# Patient Record
Sex: FEMALE | Race: BLACK OR AFRICAN AMERICAN | NOT HISPANIC OR LATINO | Employment: FULL TIME | URBAN - METROPOLITAN AREA
[De-identification: names, ages, dates, MRNs, and addresses within clinical notes are randomized per-mention and may not be internally consistent; named-entity substitution may affect disease eponyms.]

---

## 2023-10-18 ENCOUNTER — TELEPHONE (OUTPATIENT)
Dept: OBSTETRICS AND GYNECOLOGY | Facility: CLINIC | Age: 28
End: 2023-10-18
Payer: COMMERCIAL

## 2023-10-18 ENCOUNTER — LAB (OUTPATIENT)
Dept: LAB | Facility: LAB | Age: 28
End: 2023-10-18
Payer: COMMERCIAL

## 2023-10-18 DIAGNOSIS — O20.0 THREATENED ABORTION, ANTEPARTUM CONDITION OR COMPLICATION (HHS-HCC): ICD-10-CM

## 2023-10-18 LAB — B-HCG SERPL-ACNC: 458 MIU/ML

## 2023-10-18 PROCEDURE — 84702 CHORIONIC GONADOTROPIN TEST: CPT

## 2023-10-18 PROCEDURE — 36415 COLL VENOUS BLD VENIPUNCTURE: CPT

## 2023-10-18 NOTE — TELEPHONE ENCOUNTER
Spoke with Saba. Reassured brown discharge is reassuring. Order placed for serum HCG per Dr. Hollingsworth.

## 2023-10-19 PROBLEM — R25.1 TREMOR OF BOTH HANDS: Status: ACTIVE | Noted: 2023-10-19

## 2023-10-19 PROBLEM — G47.8 SLEEP PARALYSIS: Status: ACTIVE | Noted: 2022-07-11

## 2023-10-19 PROBLEM — N91.2 AMENORRHEA: Status: ACTIVE | Noted: 2023-10-19

## 2023-10-19 PROBLEM — R41.3 MEMORY DEFICIT: Status: ACTIVE | Noted: 2023-10-19

## 2023-10-19 PROBLEM — E55.9 VITAMIN D INSUFFICIENCY: Status: ACTIVE | Noted: 2023-10-19

## 2023-10-19 PROBLEM — B20 HIV INFECTION (MULTI): Chronic | Status: ACTIVE | Noted: 2021-09-09

## 2023-10-19 PROBLEM — Z20.2 EXPOSURE TO STD: Status: ACTIVE | Noted: 2023-10-19

## 2023-10-19 PROBLEM — R53.83 OTHER FATIGUE: Status: ACTIVE | Noted: 2022-12-13

## 2023-10-19 PROBLEM — R82.998 URINE LEUKOCYTES: Status: ACTIVE | Noted: 2023-10-19

## 2023-10-19 PROBLEM — F43.10 PTSD (POST-TRAUMATIC STRESS DISORDER): Chronic | Status: ACTIVE | Noted: 2021-08-16

## 2023-10-19 PROBLEM — R89.9 ABNORMAL LABORATORY TEST: Status: ACTIVE | Noted: 2023-10-19

## 2023-10-19 PROBLEM — O03.9 SAB (SPONTANEOUS ABORTION) (HHS-HCC): Status: ACTIVE | Noted: 2023-10-19

## 2023-10-19 PROBLEM — L70.9 ACNE: Status: ACTIVE | Noted: 2023-06-15

## 2023-10-19 PROBLEM — O99.019 ANEMIA IN PREGNANCY (HHS-HCC): Status: ACTIVE | Noted: 2023-10-19

## 2023-10-19 PROBLEM — O20.0 THREATENED SPONTANEOUS ABORTION (HHS-HCC): Status: ACTIVE | Noted: 2023-10-19

## 2023-10-19 PROBLEM — Z21 HIV INFECTION: Chronic | Status: ACTIVE | Noted: 2021-09-09

## 2023-10-19 PROBLEM — R55 SYNCOPE AND COLLAPSE: Status: ACTIVE | Noted: 2023-10-19

## 2023-10-19 PROBLEM — G47.9 SLEEP DISTURBANCE: Status: ACTIVE | Noted: 2022-07-11

## 2023-10-19 PROBLEM — R56.9 SEIZURE-LIKE ACTIVITY (MULTI): Status: ACTIVE | Noted: 2022-07-28

## 2023-10-19 PROBLEM — N89.8 LEUKORRHEA: Status: ACTIVE | Noted: 2023-10-19

## 2023-10-19 PROBLEM — N92.6 IRREGULAR MENSES: Status: ACTIVE | Noted: 2023-10-19

## 2023-10-19 PROBLEM — Z86.19 HISTORY OF HEPATITIS B: Status: ACTIVE | Noted: 2023-06-15

## 2023-10-19 PROBLEM — R30.0 DYSURIA: Status: ACTIVE | Noted: 2023-10-19

## 2023-10-19 PROBLEM — Z72.51 HIGH RISK SEXUAL BEHAVIOR: Status: ACTIVE | Noted: 2023-10-19

## 2023-10-19 PROBLEM — N32.89 IRRITABLE BLADDER: Status: ACTIVE | Noted: 2023-10-19

## 2023-10-19 PROBLEM — R63.0 POOR APPETITE: Status: ACTIVE | Noted: 2023-06-15

## 2023-10-19 PROBLEM — R31.9 HEMATURIA: Status: ACTIVE | Noted: 2023-10-19

## 2023-10-19 PROBLEM — H53.2 DOUBLE VISION: Status: ACTIVE | Noted: 2023-10-19

## 2023-10-19 PROBLEM — F41.0 PANIC ANXIETY SYNDROME: Status: ACTIVE | Noted: 2023-10-19

## 2023-10-19 PROBLEM — F39 UNSPECIFIED MOOD (AFFECTIVE) DISORDER (CMS-HCC): Chronic | Status: ACTIVE | Noted: 2022-05-10

## 2023-10-19 PROBLEM — F33.2 SEVERE EPISODE OF RECURRENT MAJOR DEPRESSIVE DISORDER (MULTI): Status: ACTIVE | Noted: 2022-04-20

## 2023-10-19 PROBLEM — F32.A DEPRESSIVE DISORDER: Status: ACTIVE | Noted: 2021-03-22

## 2023-10-19 PROBLEM — I95.9 LOW BLOOD PRESSURE: Status: ACTIVE | Noted: 2023-10-19

## 2023-10-19 PROBLEM — F41.3 OTHER MIXED ANXIETY DISORDERS: Chronic | Status: ACTIVE | Noted: 2022-04-20

## 2023-10-19 PROBLEM — R63.0 LOSS OF APPETITE: Status: ACTIVE | Noted: 2023-10-19

## 2023-10-19 RX ORDER — NICOTINE 11MG/24HR
2000 PATCH, TRANSDERMAL 24 HOURS TRANSDERMAL DAILY
COMMUNITY
Start: 2023-03-20 | End: 2023-10-20 | Stop reason: ALTCHOICE

## 2023-10-19 RX ORDER — FERROUS SULFATE 325(65) MG
1 TABLET ORAL DAILY
COMMUNITY
Start: 2019-01-07 | End: 2023-10-20 | Stop reason: ALTCHOICE

## 2023-10-19 RX ORDER — NORGESTIMATE AND ETHINYL ESTRADIOL 0.25-0.035
1 KIT ORAL DAILY
COMMUNITY
Start: 2018-10-04 | End: 2023-10-20 | Stop reason: ALTCHOICE

## 2023-10-19 RX ORDER — PROMETHAZINE HYDROCHLORIDE 12.5 MG/1
1 TABLET ORAL EVERY 6 HOURS PRN
COMMUNITY
Start: 2015-10-15 | End: 2023-10-20 | Stop reason: ALTCHOICE

## 2023-10-19 RX ORDER — PRENATAL VIT NO.179/IRON/FOLIC 28MG-0.8MG
1 TABLET ORAL
COMMUNITY
Start: 2023-08-22 | End: 2024-01-08 | Stop reason: WASHOUT

## 2023-10-19 RX ORDER — BENZOYL PEROXIDE 50 MG/ML
1 LIQUID TOPICAL 2 TIMES DAILY
COMMUNITY

## 2023-10-19 RX ORDER — TRETINOIN 0.25 MG/G
CREAM TOPICAL NIGHTLY
COMMUNITY
Start: 2015-07-14 | End: 2024-01-08 | Stop reason: WASHOUT

## 2023-10-19 RX ORDER — DOXYCYCLINE HYCLATE 100 MG
100 TABLET ORAL 2 TIMES DAILY
COMMUNITY
Start: 2015-07-14 | End: 2023-10-20 | Stop reason: ALTCHOICE

## 2023-10-19 RX ORDER — HYDROXYZINE HYDROCHLORIDE 25 MG/1
25 TABLET, FILM COATED ORAL DAILY
COMMUNITY
Start: 2023-06-12 | End: 2023-10-20 | Stop reason: ALTCHOICE

## 2023-10-19 RX ORDER — B-COMPLEX WITH VITAMIN C
1 TABLET ORAL DAILY
COMMUNITY
Start: 2018-02-10 | End: 2023-12-11

## 2023-10-19 RX ORDER — ACETAMINOPHEN 325 MG/1
3 TABLET ORAL EVERY 6 HOURS
COMMUNITY
Start: 2019-07-24 | End: 2024-05-15 | Stop reason: HOSPADM

## 2023-10-19 RX ORDER — LACTOSE-REDUCED FOOD 0.04G-1/ML
1 LIQUID (ML) ORAL DAILY
COMMUNITY
Start: 2023-06-12 | End: 2023-10-20 | Stop reason: ALTCHOICE

## 2023-10-19 RX ORDER — MEDROXYPROGESTERONE ACETATE 150 MG/ML
150 INJECTION, SUSPENSION INTRAMUSCULAR
COMMUNITY
Start: 2022-09-07 | End: 2023-10-20 | Stop reason: ALTCHOICE

## 2023-10-19 RX ORDER — CALCIUM CARBONATE 500(1250)
500 TABLET ORAL DAILY
COMMUNITY
Start: 2022-12-30 | End: 2023-10-20 | Stop reason: ALTCHOICE

## 2023-10-19 RX ORDER — CLINDAMYCIN PHOSPHATE 10 UG/ML
LOTION TOPICAL 2 TIMES DAILY
COMMUNITY
Start: 2015-07-14 | End: 2023-10-20 | Stop reason: ALTCHOICE

## 2023-10-19 RX ORDER — PRENATAL VIT NO.126/IRON/FOLIC 28MG-0.8MG
1 TABLET ORAL DAILY
COMMUNITY
Start: 2023-07-27 | End: 2024-01-08 | Stop reason: WASHOUT

## 2023-10-19 RX ORDER — PROGESTERONE 100 MG/1
1 CAPSULE ORAL 2 TIMES DAILY
COMMUNITY
Start: 2018-12-14 | End: 2024-01-08 | Stop reason: WASHOUT

## 2023-10-19 RX ORDER — MULTIVITAMIN WITH FOLIC ACID 400 MCG
1 TABLET ORAL
COMMUNITY
Start: 2023-03-20 | End: 2023-10-20 | Stop reason: ALTCHOICE

## 2023-10-19 RX ORDER — ALPRAZOLAM 1 MG/1
1 TABLET ORAL
COMMUNITY
Start: 2022-06-13 | End: 2023-10-20 | Stop reason: ALTCHOICE

## 2023-10-19 RX ORDER — CITALOPRAM 10 MG/1
1 TABLET ORAL DAILY
COMMUNITY
Start: 2018-09-27 | End: 2023-10-20 | Stop reason: ALTCHOICE

## 2023-10-19 RX ORDER — BICTEGRAVIR SODIUM, EMTRICITABINE, AND TENOFOVIR ALAFENAMIDE FUMARATE 50; 200; 25 MG/1; MG/1; MG/1
1 TABLET ORAL DAILY
COMMUNITY
Start: 2023-10-16

## 2023-10-19 RX ORDER — CETIRIZINE HYDROCHLORIDE, PSEUDOEPHEDRINE HYDROCHLORIDE 5; 120 MG/1; MG/1
1 TABLET, FILM COATED, EXTENDED RELEASE ORAL 2 TIMES DAILY
COMMUNITY
Start: 2023-08-22 | End: 2023-10-20 | Stop reason: ALTCHOICE

## 2023-10-19 RX ORDER — ERGOCALCIFEROL 1.25 MG/1
1 CAPSULE ORAL
COMMUNITY
Start: 2018-07-11 | End: 2023-10-20 | Stop reason: ALTCHOICE

## 2023-10-19 RX ORDER — IBUPROFEN 600 MG/1
600 TABLET ORAL EVERY 6 HOURS PRN
COMMUNITY
End: 2023-10-20 | Stop reason: ALTCHOICE

## 2023-10-19 RX ORDER — FLUTICASONE PROPIONATE 50 MCG
1 SPRAY, SUSPENSION (ML) NASAL DAILY
COMMUNITY
Start: 2023-08-22 | End: 2023-10-20 | Stop reason: ALTCHOICE

## 2023-10-19 RX ORDER — NORELGESTROMIN AND ETHINYL ESTRADIOL 150; 35 UG/D; UG/D
1 PATCH TRANSDERMAL
COMMUNITY
Start: 2019-10-01 | End: 2023-10-20 | Stop reason: ALTCHOICE

## 2023-10-19 RX ORDER — IBUPROFEN 800 MG/1
800 TABLET ORAL EVERY 6 HOURS PRN
COMMUNITY
Start: 2023-07-27 | End: 2023-10-20 | Stop reason: ALTCHOICE

## 2023-10-20 ENCOUNTER — LAB (OUTPATIENT)
Dept: LAB | Facility: LAB | Age: 28
End: 2023-10-20
Payer: COMMERCIAL

## 2023-10-20 ENCOUNTER — ANCILLARY PROCEDURE (OUTPATIENT)
Dept: OBSTETRICS AND GYNECOLOGY | Facility: CLINIC | Age: 28
End: 2023-10-20
Payer: COMMERCIAL

## 2023-10-20 ENCOUNTER — OFFICE VISIT (OUTPATIENT)
Dept: OBSTETRICS AND GYNECOLOGY | Facility: CLINIC | Age: 28
End: 2023-10-20
Payer: COMMERCIAL

## 2023-10-20 VITALS
SYSTOLIC BLOOD PRESSURE: 112 MMHG | WEIGHT: 171 LBS | DIASTOLIC BLOOD PRESSURE: 72 MMHG | HEIGHT: 65 IN | BODY MASS INDEX: 28.49 KG/M2

## 2023-10-20 DIAGNOSIS — N91.2 AMENORRHEA: ICD-10-CM

## 2023-10-20 DIAGNOSIS — O20.0 THREATENED ABORTION, ANTEPARTUM CONDITION OR COMPLICATION (HHS-HCC): ICD-10-CM

## 2023-10-20 DIAGNOSIS — O20.0 THREATENED ABORTION, ANTEPARTUM CONDITION OR COMPLICATION (HHS-HCC): Primary | ICD-10-CM

## 2023-10-20 LAB
B-HCG SERPL-ACNC: 450 MIU/ML
PREGNANCY TEST URINE, POC: POSITIVE

## 2023-10-20 PROCEDURE — 36415 COLL VENOUS BLD VENIPUNCTURE: CPT

## 2023-10-20 PROCEDURE — 84702 CHORIONIC GONADOTROPIN TEST: CPT

## 2023-10-20 PROCEDURE — 76817 TRANSVAGINAL US OBSTETRIC: CPT | Performed by: OBSTETRICS & GYNECOLOGY

## 2023-10-20 PROCEDURE — 99204 OFFICE O/P NEW MOD 45 MIN: CPT | Performed by: OBSTETRICS & GYNECOLOGY

## 2023-10-20 PROCEDURE — 1036F TOBACCO NON-USER: CPT | Performed by: OBSTETRICS & GYNECOLOGY

## 2023-10-20 PROCEDURE — 76801 OB US < 14 WKS SINGLE FETUS: CPT | Performed by: OBSTETRICS & GYNECOLOGY

## 2023-10-20 PROCEDURE — 81025 URINE PREGNANCY TEST: CPT | Performed by: OBSTETRICS & GYNECOLOGY

## 2023-10-20 NOTE — PROGRESS NOTES
Subjective   Patient ID: Saba Salamanca is a 28 y.o. female who presents for confirm pregnancy.  ntains abnormal data hCG, quantitative  Order: [id number]  Status: Final result       Visible to patient: Yes (not seen)       Next appt: None       Dx: Threatened , antepartum condi...    2 Result Notes           Component  Ref Range & Units 2 d ago  (10/18/23) 5 yr ago  (10/9/18) 5 yr ago  (18) 5 yr ago  (18) 5 yr ago  (18) 5 yr ago  (18) 5 yr ago  (18)  HCG, Beta-Quantitative  <5 mIU/mL 458 High  10.0 R, .8 R, CM 9,433.0 R, CM 32,400.0 High Panic  R, CM 76,813 Abnormal  R, CM 74,613 Abnormal  R, CM  Comment: Low-level positive HCG results can be seen in early pregnancy, in aston- or post-menopausal females due to normal pituitary HCG production, or with analytic interference. Repeat testing in 48-72 hours can aid in assessing for pregnancy as results should double in this time period. FSH measurement is recommended in aston- or post-menopausal females as concurrent elevation of FSH can support pituitary production as the source of the HCG elevation.  [facility] [place]           Narrative  Performed by: [facility]    Total HCG measurement is performed using the Siemens Atellica immunoassay which detects intact HCG and free beta HCG subunit.  This test is not indicated for use as a tumor marker.  HCG testing is performed using a different test methodology at [facility] than other [facility]. Direct result comparison  should only be made within the same method.    Patient last seen in .  28 years old.  .   5 para 2.  Children are ages 7 and 4.  2 prior vaginal deliveries.  Trying to conceive.  First day of her last menses was .  She started bleeding this past Wednesday.  We did obtain a recent hCG.  Ultrasound shows thickened endometrium but no evidence of IUP.  Non-smoker.    We will obtain serial  quantitative hCG.  Follow-up next week.  We will notify her of results.              Review of Systems   Genitourinary:  Positive for vaginal bleeding.   All other systems reviewed and are negative.      Objective   Physical Exam  Constitutional:       Appearance: She is normal weight.   Genitourinary:     General: Normal vulva.      Vagina: Normal.      Cervix: Normal.      Uterus: Normal.       Adnexa: Right adnexa normal and left adnexa normal.   Neurological:      Mental Status: She is alert.         Assessment/Plan    5 para 2.  Threatened AB.  Bleeding for 2 days.  First day LMP .  Obtain serial quantitative hCG.  Simple cyst seen right adnexa on ultrasound.  Follow-up next week    Blood type is O+

## 2023-10-23 ENCOUNTER — LAB (OUTPATIENT)
Dept: LAB | Facility: LAB | Age: 28
End: 2023-10-23
Payer: COMMERCIAL

## 2023-10-23 ENCOUNTER — TELEPHONE (OUTPATIENT)
Dept: OBSTETRICS AND GYNECOLOGY | Facility: CLINIC | Age: 28
End: 2023-10-23

## 2023-10-23 DIAGNOSIS — O20.0 THREATENED ABORTION, ANTEPARTUM CONDITION OR COMPLICATION (HHS-HCC): ICD-10-CM

## 2023-10-23 LAB — B-HCG SERPL-ACNC: 43 MIU/ML

## 2023-10-23 PROCEDURE — 84702 CHORIONIC GONADOTROPIN TEST: CPT

## 2023-10-23 PROCEDURE — 36415 COLL VENOUS BLD VENIPUNCTURE: CPT

## 2023-10-24 ENCOUNTER — OFFICE VISIT (OUTPATIENT)
Dept: OBSTETRICS AND GYNECOLOGY | Facility: CLINIC | Age: 28
End: 2023-10-24
Payer: COMMERCIAL

## 2023-10-24 ENCOUNTER — LAB (OUTPATIENT)
Dept: LAB | Facility: LAB | Age: 28
End: 2023-10-24
Payer: COMMERCIAL

## 2023-10-24 VITALS
WEIGHT: 173 LBS | DIASTOLIC BLOOD PRESSURE: 74 MMHG | SYSTOLIC BLOOD PRESSURE: 126 MMHG | BODY MASS INDEX: 28.82 KG/M2 | HEIGHT: 65 IN

## 2023-10-24 DIAGNOSIS — O03.9 SPONTANEOUS MISCARRIAGE (HHS-HCC): Primary | ICD-10-CM

## 2023-10-24 DIAGNOSIS — O20.0 THREATENED ABORTION, ANTEPARTUM CONDITION OR COMPLICATION (HHS-HCC): ICD-10-CM

## 2023-10-24 DIAGNOSIS — O03.9 SPONTANEOUS MISCARRIAGE (HHS-HCC): ICD-10-CM

## 2023-10-24 LAB
ERYTHROCYTE [DISTWIDTH] IN BLOOD BY AUTOMATED COUNT: 15.7 % (ref 11.5–14.5)
HCT VFR BLD AUTO: 35.2 % (ref 36–46)
HGB BLD-MCNC: 10.7 G/DL (ref 12–16)
MCH RBC QN AUTO: 23.1 PG (ref 26–34)
MCHC RBC AUTO-ENTMCNC: 30.4 G/DL (ref 32–36)
MCV RBC AUTO: 76 FL (ref 80–100)
NRBC BLD-RTO: 0 /100 WBCS (ref 0–0)
PLATELET # BLD AUTO: 301 X10*3/UL (ref 150–450)
PMV BLD AUTO: 11.3 FL (ref 7.5–11.5)
PREGNANCY TEST URINE, POC: NEGATIVE
RBC # BLD AUTO: 4.63 X10*6/UL (ref 4–5.2)
WBC # BLD AUTO: 7.5 X10*3/UL (ref 4.4–11.3)

## 2023-10-24 PROCEDURE — 99214 OFFICE O/P EST MOD 30 MIN: CPT | Performed by: OBSTETRICS & GYNECOLOGY

## 2023-10-24 PROCEDURE — 1036F TOBACCO NON-USER: CPT | Performed by: OBSTETRICS & GYNECOLOGY

## 2023-10-24 PROCEDURE — 85027 COMPLETE CBC AUTOMATED: CPT

## 2023-10-24 PROCEDURE — 81025 URINE PREGNANCY TEST: CPT | Performed by: OBSTETRICS & GYNECOLOGY

## 2023-10-24 PROCEDURE — 36415 COLL VENOUS BLD VENIPUNCTURE: CPT

## 2023-10-24 NOTE — PROGRESS NOTES
Subjective   Patient ID: Saba Salamanca is a 28 y.o. female who presents for follow up early pregnancy.   Result Note           Component  Ref Range & Units 1 d ago  (10/23/23) 4 d ago  (10/20/23) 6 d ago  (10/18/23) 5 yr ago  (10/9/18) 5 yr ago  (18) 5 yr ago  (18) 5 yr ago  (18)  HCG, Beta-Quantitative  <5 mIU/mL 43 High  450 High   High  CM 10.0 R, .8 R, CM 9,433.0 R, CM 32,400.0 High Panic  R, CM  Comment: Low-level positive HCG results can be seen in early pregnancy, in aston- or post-menopausal females due to normal pituitary HCG production, or with analytic interference. Repeat testing in 48-72 hours can aid in assessing for pregnancy as results should double in this time period. FSH measurement is recommended in aston- or post-menopausal females as concurrent elevation of FSH can support pituitary production as the source of the HCG elevation.  [facility]           Narrative  Performed by: [facility]    Total HCG measurement is performed using the Siemens Atellica immunoassay which detects intact HCG and free beta HCG subunit.  This test is not indicated for use as a tumor marker.  HCG testing is performed using a different test methodology at [facility] than other [facility]. Direct result comparison  should only be made within the same method.     Established patient 28 years old.   5 para 2.  Has been bleeding for about a week now.  Recently passing tissue and states her bleeding is now like a menses.  Her hCG has dropped from over 400 to the 40s.  Consistent with spontaneous miscarriage.  We reviewed options including observation versus intervention with suction D&C.  At this point we will get a CBC and then see her in the next week.  At any point if her bleeding becomes heavier or continues for prolonged duration we can consider surgical intervention      Blood type O+            Review of Systems    Genitourinary:  Positive for vaginal bleeding.       Objective   Physical Exam  Vitals reviewed.   Neurological:      Mental Status: She is alert.         Assessment/Plan   Spontaneous miscarriage.  Blood type a positive.  Obtain CBC.  Monitor bleeding for now.  .  2 children with same partner before they remarried.  She would like to have a child after marriage    For future pregnancy consider baby aspirin and progesterone supplementation

## 2023-10-27 ENCOUNTER — TELEPHONE (OUTPATIENT)
Dept: OBSTETRICS AND GYNECOLOGY | Facility: CLINIC | Age: 28
End: 2023-10-27

## 2023-10-27 ENCOUNTER — APPOINTMENT (OUTPATIENT)
Dept: OBSTETRICS AND GYNECOLOGY | Facility: CLINIC | Age: 28
End: 2023-10-27
Payer: COMMERCIAL

## 2023-11-15 ENCOUNTER — LAB (OUTPATIENT)
Dept: LAB | Facility: LAB | Age: 28
End: 2023-11-15
Payer: COMMERCIAL

## 2023-11-15 DIAGNOSIS — D50.9 IRON DEFICIENCY ANEMIA, UNSPECIFIED: Primary | ICD-10-CM

## 2023-11-15 PROCEDURE — 85025 COMPLETE CBC W/AUTO DIFF WBC: CPT

## 2023-11-15 PROCEDURE — 86703 HIV-1/HIV-2 1 RESULT ANTBDY: CPT

## 2023-11-15 PROCEDURE — 36415 COLL VENOUS BLD VENIPUNCTURE: CPT

## 2023-11-15 PROCEDURE — 87389 HIV-1 AG W/HIV-1&-2 AB AG IA: CPT

## 2023-11-16 LAB
BASOPHILS # BLD AUTO: 0.04 X10*3/UL (ref 0–0.1)
BASOPHILS NFR BLD AUTO: 0.5 %
EOSINOPHIL # BLD AUTO: 0.1 X10*3/UL (ref 0–0.7)
EOSINOPHIL NFR BLD AUTO: 1.2 %
ERYTHROCYTE [DISTWIDTH] IN BLOOD BY AUTOMATED COUNT: 16 % (ref 11.5–14.5)
HCT VFR BLD AUTO: 37.3 % (ref 36–46)
HGB BLD-MCNC: 11.3 G/DL (ref 12–16)
HIV 1 & 2 AB SERPL IA.RAPID: ABNORMAL
HIV 1+2 AB+HIV1 P24 AG SERPL QL IA: ABNORMAL
IMM GRANULOCYTES # BLD AUTO: 0.02 X10*3/UL (ref 0–0.7)
IMM GRANULOCYTES NFR BLD AUTO: 0.2 % (ref 0–0.9)
LYMPHOCYTES # BLD AUTO: 2.72 X10*3/UL (ref 1.2–4.8)
LYMPHOCYTES NFR BLD AUTO: 32.1 %
MCH RBC QN AUTO: 22.6 PG (ref 26–34)
MCHC RBC AUTO-ENTMCNC: 30.3 G/DL (ref 32–36)
MCV RBC AUTO: 75 FL (ref 80–100)
MONOCYTES # BLD AUTO: 0.78 X10*3/UL (ref 0.1–1)
MONOCYTES NFR BLD AUTO: 9.2 %
NEUTROPHILS # BLD AUTO: 4.82 X10*3/UL (ref 1.2–7.7)
NEUTROPHILS NFR BLD AUTO: 56.8 %
NRBC BLD-RTO: 0 /100 WBCS (ref 0–0)
PLATELET # BLD AUTO: 309 X10*3/UL (ref 150–450)
RBC # BLD AUTO: 5 X10*6/UL (ref 4–5.2)
WBC # BLD AUTO: 8.5 X10*3/UL (ref 4.4–11.3)

## 2023-12-11 ENCOUNTER — APPOINTMENT (OUTPATIENT)
Dept: RADIOLOGY | Facility: HOSPITAL | Age: 28
End: 2023-12-11
Payer: COMMERCIAL

## 2023-12-11 ENCOUNTER — HOSPITAL ENCOUNTER (EMERGENCY)
Facility: HOSPITAL | Age: 28
Discharge: HOME | End: 2023-12-11
Attending: STUDENT IN AN ORGANIZED HEALTH CARE EDUCATION/TRAINING PROGRAM
Payer: COMMERCIAL

## 2023-12-11 VITALS
WEIGHT: 171 LBS | HEIGHT: 65 IN | OXYGEN SATURATION: 99 % | TEMPERATURE: 98.6 F | BODY MASS INDEX: 28.49 KG/M2 | RESPIRATION RATE: 18 BRPM | HEART RATE: 71 BPM | DIASTOLIC BLOOD PRESSURE: 80 MMHG | SYSTOLIC BLOOD PRESSURE: 122 MMHG

## 2023-12-11 DIAGNOSIS — Z34.90 INTRAUTERINE PREGNANCY (HHS-HCC): Primary | ICD-10-CM

## 2023-12-11 DIAGNOSIS — O23.41 URINARY TRACT INFECTION IN MOTHER DURING FIRST TRIMESTER OF PREGNANCY (HHS-HCC): ICD-10-CM

## 2023-12-11 LAB
ANION GAP SERPL CALC-SCNC: 10 MMOL/L
APPEARANCE UR: CLEAR
BACTERIA #/AREA URNS AUTO: ABNORMAL /HPF
BASOPHILS # BLD AUTO: 0.04 X10*3/UL (ref 0–0.1)
BASOPHILS NFR BLD AUTO: 0.3 %
BILIRUB UR STRIP.AUTO-MCNC: NEGATIVE MG/DL
BUN SERPL-MCNC: 8 MG/DL (ref 8–25)
CALCIUM SERPL-MCNC: 8.8 MG/DL (ref 8.5–10.4)
CHLORIDE SERPL-SCNC: 103 MMOL/L (ref 97–107)
CO2 SERPL-SCNC: 24 MMOL/L (ref 24–31)
COLOR UR: YELLOW
CREAT SERPL-MCNC: 0.7 MG/DL (ref 0.4–1.6)
EOSINOPHIL # BLD AUTO: 0.12 X10*3/UL (ref 0–0.7)
EOSINOPHIL NFR BLD AUTO: 0.9 %
ERYTHROCYTE [DISTWIDTH] IN BLOOD BY AUTOMATED COUNT: 15.3 % (ref 11.5–14.5)
GFR SERPL CREATININE-BSD FRML MDRD: >90 ML/MIN/1.73M*2
GLUCOSE SERPL-MCNC: 82 MG/DL (ref 65–99)
GLUCOSE UR STRIP.AUTO-MCNC: NORMAL MG/DL
HCG SERPL-ACNC: NORMAL MIU/ML
HCT VFR BLD AUTO: 36.2 % (ref 36–46)
HGB BLD-MCNC: 11.3 G/DL (ref 12–16)
HOLD SPECIMEN: NORMAL
IMM GRANULOCYTES # BLD AUTO: 0.05 X10*3/UL (ref 0–0.7)
IMM GRANULOCYTES NFR BLD AUTO: 0.4 % (ref 0–0.9)
KETONES UR STRIP.AUTO-MCNC: NEGATIVE MG/DL
LEUKOCYTE ESTERASE UR QL STRIP.AUTO: ABNORMAL
LYMPHOCYTES # BLD AUTO: 2.73 X10*3/UL (ref 1.2–4.8)
LYMPHOCYTES NFR BLD AUTO: 21.4 %
MCH RBC QN AUTO: 23 PG (ref 26–34)
MCHC RBC AUTO-ENTMCNC: 31.2 G/DL (ref 32–36)
MCV RBC AUTO: 74 FL (ref 80–100)
MONOCYTES # BLD AUTO: 0.9 X10*3/UL (ref 0.1–1)
MONOCYTES NFR BLD AUTO: 7.1 %
MUCOUS THREADS #/AREA URNS AUTO: ABNORMAL /LPF
NEUTROPHILS # BLD AUTO: 8.91 X10*3/UL (ref 1.2–7.7)
NEUTROPHILS NFR BLD AUTO: 69.9 %
NITRITE UR QL STRIP.AUTO: NEGATIVE
NRBC BLD-RTO: 0 /100 WBCS (ref 0–0)
PH UR STRIP.AUTO: 6 [PH]
PLATELET # BLD AUTO: 324 X10*3/UL (ref 150–450)
POTASSIUM SERPL-SCNC: 4.1 MMOL/L (ref 3.4–5.1)
PROT UR STRIP.AUTO-MCNC: ABNORMAL MG/DL
RBC # BLD AUTO: 4.92 X10*6/UL (ref 4–5.2)
RBC # UR STRIP.AUTO: NEGATIVE /UL
RBC #/AREA URNS AUTO: ABNORMAL /HPF
SODIUM SERPL-SCNC: 137 MMOL/L (ref 133–145)
SP GR UR STRIP.AUTO: 1.02
SQUAMOUS #/AREA URNS AUTO: ABNORMAL /HPF
UROBILINOGEN UR STRIP.AUTO-MCNC: NORMAL MG/DL
WBC # BLD AUTO: 12.8 X10*3/UL (ref 4.4–11.3)
WBC #/AREA URNS AUTO: ABNORMAL /HPF

## 2023-12-11 PROCEDURE — 99284 EMERGENCY DEPT VISIT MOD MDM: CPT | Mod: 25 | Performed by: STUDENT IN AN ORGANIZED HEALTH CARE EDUCATION/TRAINING PROGRAM

## 2023-12-11 PROCEDURE — 84702 CHORIONIC GONADOTROPIN TEST: CPT | Performed by: STUDENT IN AN ORGANIZED HEALTH CARE EDUCATION/TRAINING PROGRAM

## 2023-12-11 PROCEDURE — 87086 URINE CULTURE/COLONY COUNT: CPT | Mod: WESLAB | Performed by: STUDENT IN AN ORGANIZED HEALTH CARE EDUCATION/TRAINING PROGRAM

## 2023-12-11 PROCEDURE — 76817 TRANSVAGINAL US OBSTETRIC: CPT

## 2023-12-11 PROCEDURE — 36415 COLL VENOUS BLD VENIPUNCTURE: CPT | Performed by: STUDENT IN AN ORGANIZED HEALTH CARE EDUCATION/TRAINING PROGRAM

## 2023-12-11 PROCEDURE — 85025 COMPLETE CBC W/AUTO DIFF WBC: CPT | Performed by: STUDENT IN AN ORGANIZED HEALTH CARE EDUCATION/TRAINING PROGRAM

## 2023-12-11 PROCEDURE — 81001 URINALYSIS AUTO W/SCOPE: CPT | Performed by: STUDENT IN AN ORGANIZED HEALTH CARE EDUCATION/TRAINING PROGRAM

## 2023-12-11 PROCEDURE — 80048 BASIC METABOLIC PNL TOTAL CA: CPT | Performed by: STUDENT IN AN ORGANIZED HEALTH CARE EDUCATION/TRAINING PROGRAM

## 2023-12-11 PROCEDURE — 76801 OB US < 14 WKS SINGLE FETUS: CPT

## 2023-12-11 RX ORDER — CEPHALEXIN 500 MG/1
500 CAPSULE ORAL 2 TIMES DAILY
Qty: 20 CAPSULE | Refills: 0 | Status: SHIPPED | OUTPATIENT
Start: 2023-12-11 | End: 2023-12-21

## 2023-12-11 RX ORDER — CEPHALEXIN 500 MG/1
500 CAPSULE ORAL 2 TIMES DAILY
Qty: 20 CAPSULE | Refills: 0 | Status: SHIPPED | OUTPATIENT
Start: 2023-12-11 | End: 2023-12-11 | Stop reason: SDUPTHER

## 2023-12-11 ASSESSMENT — COLUMBIA-SUICIDE SEVERITY RATING SCALE - C-SSRS
2. HAVE YOU ACTUALLY HAD ANY THOUGHTS OF KILLING YOURSELF?: NO
1. IN THE PAST MONTH, HAVE YOU WISHED YOU WERE DEAD OR WISHED YOU COULD GO TO SLEEP AND NOT WAKE UP?: NO
6. HAVE YOU EVER DONE ANYTHING, STARTED TO DO ANYTHING, OR PREPARED TO DO ANYTHING TO END YOUR LIFE?: NO

## 2023-12-11 ASSESSMENT — PAIN SCALES - GENERAL: PAINLEVEL_OUTOF10: 0 - NO PAIN

## 2023-12-11 ASSESSMENT — PAIN - FUNCTIONAL ASSESSMENT: PAIN_FUNCTIONAL_ASSESSMENT: 0-10

## 2023-12-12 ENCOUNTER — TELEPHONE (OUTPATIENT)
Dept: OBSTETRICS AND GYNECOLOGY | Facility: CLINIC | Age: 28
End: 2023-12-12

## 2023-12-12 ENCOUNTER — APPOINTMENT (OUTPATIENT)
Dept: OBSTETRICS AND GYNECOLOGY | Facility: CLINIC | Age: 28
End: 2023-12-12
Payer: COMMERCIAL

## 2023-12-12 LAB — BACTERIA UR CULT: NORMAL

## 2023-12-12 NOTE — ED PROVIDER NOTES
HPI   Chief Complaint   Patient presents with    Anxiety     Requesting ultrasound for pregnancy.        28-year-old  presents with possible pregnancy.  Patient states she has had multiple miscarriages in the past, with a recent miscarriage in October.  She states she took a at home pregnancy test last week but is unable to see her OB until Friday.  Due to increased anxiety related to her prior miscarriages, she presents emergency department for check.  She denies vaginal bleeding.  She does note increased vaginal discharge.  No abdominal pain but does note lower back pain.  No recent fevers or chills.                          No data recorded                Patient History   Past Medical History:   Diagnosis Date    Encounter for supervision of normal first pregnancy, first trimester 2016    Encounter for prenatal care in first trimester of first pregnancy    Encounter for supervision of normal first pregnancy, second trimester 2016    Encounter for prenatal care of first pregnancy, antepartum, second trimester    Encounter for supervision of normal first pregnancy, unspecified trimester 2016    Encounter for supervision of normal first pregnancy    Personal history of other diseases of the female genital tract 2016    History of irregular menstrual cycles    Uterine size-date discrepancy, unspecified trimester 2016    Fetal size inconsistent with dates     No past surgical history on file.  Family History   Problem Relation Name Age of Onset    Anemia Mother      Diabetes Maternal Grandmother      Anemia Maternal Grandmother       Social History     Tobacco Use    Smoking status: Never    Smokeless tobacco: Never   Substance Use Topics    Alcohol use: Not on file    Drug use: Not on file       Physical Exam   ED Triage Vitals [23 1551]   Temp Heart Rate Resp BP   37 °C (98.6 °F) 69 16 119/66      SpO2 Temp src Heart Rate Source Patient Position   99 % -- -- --      BP  Location FiO2 (%)     -- --       Physical Exam  Vitals and nursing note reviewed.   Constitutional:       General: She is not in acute distress.     Appearance: She is not ill-appearing.   HENT:      Head: Normocephalic and atraumatic.      Mouth/Throat:      Mouth: Mucous membranes are moist.      Pharynx: Oropharynx is clear.   Eyes:      Extraocular Movements: Extraocular movements intact.      Conjunctiva/sclera: Conjunctivae normal.      Pupils: Pupils are equal, round, and reactive to light.   Cardiovascular:      Rate and Rhythm: Normal rate and regular rhythm.   Pulmonary:      Effort: Pulmonary effort is normal. No respiratory distress.      Breath sounds: Normal breath sounds.   Abdominal:      General: There is no distension.      Palpations: Abdomen is soft.      Tenderness: There is no abdominal tenderness.   Musculoskeletal:         General: No swelling or deformity. Normal range of motion.      Cervical back: Normal range of motion and neck supple.   Skin:     General: Skin is warm and dry.      Capillary Refill: Capillary refill takes less than 2 seconds.   Neurological:      General: No focal deficit present.      Mental Status: She is alert and oriented to person, place, and time. Mental status is at baseline.   Psychiatric:         Mood and Affect: Mood normal.         Behavior: Behavior normal.         ED Course & MDM   Diagnoses as of 23   Intrauterine pregnancy   Urinary tract infection in mother during first trimester of pregnancy       Medical Decision Making   28 y.o. female  who presents to the ED with possible pregnancy. She is approximately 8 weeks pregnant by last known sexual encounter. Vital signs are stable. I have considered the following conditions: intrauterine pregnancy, Implantation bleeding,  (threatened, incomplete, spontaneous, septic), molar pregnancy, ectopic pregnancy, blighted ovum.  The patient has no hypotension, orthostasis, tachycardia, or  significant reduction of her Hgb and Hct.  Patient has evidence of an intrauterine pregnancy approximately 7 weeks and 4 days gestation with fetal heart tones.  UA appears mildly positive for infection.  At this time, the patient is without significant vital sign abnormalities. No further ED work-up or management is indicated, as the patient does not appear to have urgent/emergent medical condition.  Prescribed antibiotics as well as prenatal vitamins and encouraged to follow-up with OB.          Procedure  Procedures     Nicole Lim MD  12/11/23 2005

## 2023-12-12 NOTE — DISCHARGE INSTRUCTIONS
You were seen in the emergency department today for intrauterine pregnancy.  At this time your ultrasound shows a single live pregnancy measuring approximately 7 weeks and 4 days with an estimated delivery date of July 25, 2024.  He is follow-up with your OB for further management of your pregnancy.

## 2023-12-12 NOTE — TELEPHONE ENCOUNTER
Yes this antibiotic is safe.  I am seeing her tomorrow so we can address the progesterone issue tomorrow

## 2023-12-13 ENCOUNTER — LAB (OUTPATIENT)
Dept: LAB | Facility: LAB | Age: 28
End: 2023-12-13
Payer: COMMERCIAL

## 2023-12-13 ENCOUNTER — OFFICE VISIT (OUTPATIENT)
Dept: OBSTETRICS AND GYNECOLOGY | Facility: CLINIC | Age: 28
End: 2023-12-13
Payer: COMMERCIAL

## 2023-12-13 VITALS
BODY MASS INDEX: 28.82 KG/M2 | SYSTOLIC BLOOD PRESSURE: 120 MMHG | DIASTOLIC BLOOD PRESSURE: 70 MMHG | WEIGHT: 173 LBS | HEIGHT: 65 IN

## 2023-12-13 DIAGNOSIS — O36.80X0 PREGNANCY WITH INCONCLUSIVE FETAL VIABILITY (HHS-HCC): Primary | ICD-10-CM

## 2023-12-13 DIAGNOSIS — O36.80X0 PREGNANCY WITH INCONCLUSIVE FETAL VIABILITY (HHS-HCC): ICD-10-CM

## 2023-12-13 DIAGNOSIS — Z21: ICD-10-CM

## 2023-12-13 DIAGNOSIS — N91.2 AMENORRHEA: ICD-10-CM

## 2023-12-13 DIAGNOSIS — Z32.01 PREGNANCY TEST POSITIVE (HHS-HCC): ICD-10-CM

## 2023-12-13 DIAGNOSIS — O98.711: ICD-10-CM

## 2023-12-13 DIAGNOSIS — Z21 ASYMPTOMATIC HIV INFECTION, WITH NO HISTORY OF HIV-RELATED ILLNESS (MULTI): Chronic | ICD-10-CM

## 2023-12-13 LAB
ABO GROUP (TYPE) IN BLOOD: NORMAL
ANTIBODY SCREEN: NORMAL
ERYTHROCYTE [DISTWIDTH] IN BLOOD BY AUTOMATED COUNT: 15.2 % (ref 11.5–14.5)
HBV SURFACE AG SERPL QL IA: NONREACTIVE
HCT VFR BLD AUTO: 37.3 % (ref 36–46)
HGB BLD-MCNC: 11.9 G/DL (ref 12–16)
MCH RBC QN AUTO: 23.6 PG (ref 26–34)
MCHC RBC AUTO-ENTMCNC: 31.9 G/DL (ref 32–36)
MCV RBC AUTO: 74 FL (ref 80–100)
NRBC BLD-RTO: 0 /100 WBCS (ref 0–0)
PLATELET # BLD AUTO: 351 X10*3/UL (ref 150–450)
POC BLOOD, URINE: NEGATIVE
POC GLUCOSE, URINE: NEGATIVE MG/DL
POC LEUKOCYTES, URINE: NEGATIVE
POC NITRITE,URINE: NEGATIVE
POC PROTEIN, URINE: NEGATIVE MG/DL
PREGNANCY TEST URINE, POC: POSITIVE
RBC # BLD AUTO: 5.05 X10*6/UL (ref 4–5.2)
REFLEX ADDED, ANEMIA PANEL: NORMAL
RH FACTOR (ANTIGEN D): NORMAL
RUBV IGG SERPL IA-ACNC: 0.6 IA
RUBV IGG SERPL QL IA: NEGATIVE
WBC # BLD AUTO: 11.4 X10*3/UL (ref 4.4–11.3)

## 2023-12-13 PROCEDURE — 86850 RBC ANTIBODY SCREEN: CPT

## 2023-12-13 PROCEDURE — 76857 US EXAM PELVIC LIMITED: CPT | Performed by: OBSTETRICS & GYNECOLOGY

## 2023-12-13 PROCEDURE — 99214 OFFICE O/P EST MOD 30 MIN: CPT | Performed by: OBSTETRICS & GYNECOLOGY

## 2023-12-13 PROCEDURE — 86900 BLOOD TYPING SEROLOGIC ABO: CPT

## 2023-12-13 PROCEDURE — 81002 URINALYSIS NONAUTO W/O SCOPE: CPT | Performed by: OBSTETRICS & GYNECOLOGY

## 2023-12-13 PROCEDURE — 86317 IMMUNOASSAY INFECTIOUS AGENT: CPT

## 2023-12-13 PROCEDURE — 86703 HIV-1/HIV-2 1 RESULT ANTBDY: CPT

## 2023-12-13 PROCEDURE — 36415 COLL VENOUS BLD VENIPUNCTURE: CPT

## 2023-12-13 PROCEDURE — 1036F TOBACCO NON-USER: CPT | Performed by: OBSTETRICS & GYNECOLOGY

## 2023-12-13 PROCEDURE — 87340 HEPATITIS B SURFACE AG IA: CPT

## 2023-12-13 PROCEDURE — 86780 TREPONEMA PALLIDUM: CPT

## 2023-12-13 PROCEDURE — 86901 BLOOD TYPING SEROLOGIC RH(D): CPT

## 2023-12-13 PROCEDURE — 87389 HIV-1 AG W/HIV-1&-2 AB AG IA: CPT

## 2023-12-13 PROCEDURE — 81025 URINE PREGNANCY TEST: CPT | Performed by: OBSTETRICS & GYNECOLOGY

## 2023-12-13 PROCEDURE — 85027 COMPLETE CBC AUTOMATED: CPT

## 2023-12-13 PROCEDURE — 86803 HEPATITIS C AB TEST: CPT

## 2023-12-13 NOTE — PROGRESS NOTES
Subjective   Patient ID: Saba Salamanca is a 28 y.o. female who presents for Confirm pregnancy.  Amenorrhea since her miscarriage.  Was in the emergency room.  Started on cephalexin for UTI.  UTI culture is negative.  Can discontinue antibiotics    Abdominal pelvic exam along with ultrasound confirms 7.5-week viable IUP.    Obtain prenatal labs.  Follow-up 4 weeks new OB visit consider noninvasive prenatal testing.  Repeat ultrasound next visit        Review of Systems   All other systems reviewed and are negative.      Objective   Physical Exam  Vitals reviewed.   Constitutional:       Appearance: Normal appearance. She is normal weight.   Genitourinary:     General: Normal vulva.      Vagina: Normal.      Cervix: Normal.      Uterus: Normal.       Adnexa: Right adnexa normal and left adnexa normal.   Neurological:      Mental Status: She is alert.         Assessment/Plan   Confirmation of pregnancy.  Urine culture negative.  Stop antibiotics.  Obtain prenatal blood work.  Continue prenatal vitamins.  Follow-up 4 weeks new OB visit.  Consider noninvasive prenatal testing    Aware of positive HIV status.  As is her partner of 9 years.  Seeing infectious disease specialist.  Obtain MFM consult         Donald Hollingsworth MD 12/13/23 2:23 PM

## 2023-12-14 LAB
HCV AB SER QL: NONREACTIVE
HIV 1 & 2 AB SERPL IA.RAPID: ABNORMAL
HIV 1+2 AB+HIV1 P24 AG SERPL QL IA: ABNORMAL
T PALLIDUM AB SER QL: NONREACTIVE

## 2023-12-15 ENCOUNTER — APPOINTMENT (OUTPATIENT)
Dept: OBSTETRICS AND GYNECOLOGY | Facility: CLINIC | Age: 28
End: 2023-12-15
Payer: COMMERCIAL

## 2024-01-07 PROBLEM — H53.2 DOUBLE VISION: Status: RESOLVED | Noted: 2023-10-19 | Resolved: 2024-01-07

## 2024-01-07 PROBLEM — O20.0 THREATENED SPONTANEOUS ABORTION (HHS-HCC): Status: RESOLVED | Noted: 2023-10-19 | Resolved: 2024-01-07

## 2024-01-07 PROBLEM — N32.89 IRRITABLE BLADDER: Status: RESOLVED | Noted: 2023-10-19 | Resolved: 2024-01-07

## 2024-01-07 PROBLEM — O98.719: Status: ACTIVE | Noted: 2021-09-09

## 2024-01-07 PROBLEM — R82.998 URINE LEUKOCYTES: Status: RESOLVED | Noted: 2023-10-19 | Resolved: 2024-01-07

## 2024-01-07 PROBLEM — R30.0 DYSURIA: Status: RESOLVED | Noted: 2023-10-19 | Resolved: 2024-01-07

## 2024-01-07 PROBLEM — R31.9 HEMATURIA: Status: RESOLVED | Noted: 2023-10-19 | Resolved: 2024-01-07

## 2024-01-07 PROBLEM — O03.9 SAB (SPONTANEOUS ABORTION) (HHS-HCC): Status: RESOLVED | Noted: 2023-10-19 | Resolved: 2024-01-07

## 2024-01-07 PROBLEM — Z72.51 HIGH RISK SEXUAL BEHAVIOR: Status: RESOLVED | Noted: 2023-10-19 | Resolved: 2024-01-07

## 2024-01-07 PROBLEM — N92.6 IRREGULAR MENSES: Status: RESOLVED | Noted: 2023-10-19 | Resolved: 2024-01-07

## 2024-01-07 PROBLEM — N91.2 AMENORRHEA: Status: RESOLVED | Noted: 2023-10-19 | Resolved: 2024-01-07

## 2024-01-07 PROBLEM — N89.8 LEUKORRHEA: Status: RESOLVED | Noted: 2023-10-19 | Resolved: 2024-01-07

## 2024-01-07 PROBLEM — I95.9 LOW BLOOD PRESSURE: Status: RESOLVED | Noted: 2023-10-19 | Resolved: 2024-01-07

## 2024-01-08 ENCOUNTER — ANCILLARY PROCEDURE (OUTPATIENT)
Dept: RADIOLOGY | Facility: CLINIC | Age: 29
End: 2024-01-08
Payer: COMMERCIAL

## 2024-01-08 ENCOUNTER — INITIAL PRENATAL (OUTPATIENT)
Dept: MATERNAL FETAL MEDICINE | Facility: CLINIC | Age: 29
End: 2024-01-08
Payer: COMMERCIAL

## 2024-01-08 ENCOUNTER — LAB (OUTPATIENT)
Dept: LAB | Facility: LAB | Age: 29
End: 2024-01-08
Payer: COMMERCIAL

## 2024-01-08 VITALS — WEIGHT: 170.3 LBS | SYSTOLIC BLOOD PRESSURE: 114 MMHG | BODY MASS INDEX: 28.34 KG/M2 | DIASTOLIC BLOOD PRESSURE: 77 MMHG

## 2024-01-08 DIAGNOSIS — Z21: ICD-10-CM

## 2024-01-08 DIAGNOSIS — O98.711: ICD-10-CM

## 2024-01-08 DIAGNOSIS — O09.91 PREGNANCY, SUPERVISION, HIGH-RISK, FIRST TRIMESTER (HHS-HCC): ICD-10-CM

## 2024-01-08 DIAGNOSIS — O09.91 SUPERVISION OF HIGH RISK PREGNANCY IN FIRST TRIMESTER (HHS-HCC): ICD-10-CM

## 2024-01-08 DIAGNOSIS — Z32.01 PREGNANCY TEST POSITIVE (HHS-HCC): ICD-10-CM

## 2024-01-08 DIAGNOSIS — O98.719 HIV INFECTION IN MOTHER DURING PREGNANCY, ANTEPARTUM (HHS-HCC): Primary | ICD-10-CM

## 2024-01-08 PROBLEM — E55.9 VITAMIN D DEFICIENCY: Status: RESOLVED | Noted: 2023-10-19 | Resolved: 2024-01-08

## 2024-01-08 PROBLEM — R63.0 POOR APPETITE: Status: RESOLVED | Noted: 2023-06-15 | Resolved: 2024-01-08

## 2024-01-08 PROBLEM — R56.9 SEIZURE-LIKE ACTIVITY (MULTI): Status: RESOLVED | Noted: 2022-07-28 | Resolved: 2024-01-08

## 2024-01-08 PROBLEM — O09.299: Status: RESOLVED | Noted: 2018-10-09 | Resolved: 2024-01-08

## 2024-01-08 PROBLEM — F41.3 OTHER MIXED ANXIETY DISORDERS: Chronic | Status: RESOLVED | Noted: 2022-04-20 | Resolved: 2024-01-08

## 2024-01-08 PROBLEM — R41.3 MEMORY DEFICIT: Status: RESOLVED | Noted: 2023-10-19 | Resolved: 2024-01-08

## 2024-01-08 PROBLEM — L70.9 ACNE: Status: RESOLVED | Noted: 2023-06-15 | Resolved: 2024-01-08

## 2024-01-08 PROBLEM — R63.0 LOSS OF APPETITE: Status: RESOLVED | Noted: 2023-10-19 | Resolved: 2024-01-08

## 2024-01-08 PROBLEM — R25.1 TREMOR OF BOTH HANDS: Status: RESOLVED | Noted: 2023-10-19 | Resolved: 2024-01-08

## 2024-01-08 PROBLEM — G47.8 SLEEP PARALYSIS: Status: RESOLVED | Noted: 2022-07-11 | Resolved: 2024-01-08

## 2024-01-08 PROBLEM — G47.9 SLEEP DISTURBANCE: Status: RESOLVED | Noted: 2022-07-11 | Resolved: 2024-01-08

## 2024-01-08 PROBLEM — R53.83 OTHER FATIGUE: Status: RESOLVED | Noted: 2022-12-13 | Resolved: 2024-01-08

## 2024-01-08 PROBLEM — R55 SYNCOPE AND COLLAPSE: Status: RESOLVED | Noted: 2023-10-19 | Resolved: 2024-01-08

## 2024-01-08 PROBLEM — F39 UNSPECIFIED MOOD (AFFECTIVE) DISORDER (CMS-HCC): Chronic | Status: RESOLVED | Noted: 2022-05-10 | Resolved: 2024-01-08

## 2024-01-08 PROBLEM — Z78.9 HEPATITIS B IMMUNE: Status: ACTIVE | Noted: 2023-06-15

## 2024-01-08 PROCEDURE — 76813 OB US NUCHAL MEAS 1 GEST: CPT

## 2024-01-08 PROCEDURE — 76813 OB US NUCHAL MEAS 1 GEST: CPT | Performed by: OBSTETRICS & GYNECOLOGY

## 2024-01-08 PROCEDURE — 99215 OFFICE O/P EST HI 40 MIN: CPT | Mod: TH | Performed by: STUDENT IN AN ORGANIZED HEALTH CARE EDUCATION/TRAINING PROGRAM

## 2024-01-08 PROCEDURE — 99205 OFFICE O/P NEW HI 60 MIN: CPT | Performed by: STUDENT IN AN ORGANIZED HEALTH CARE EDUCATION/TRAINING PROGRAM

## 2024-01-08 PROCEDURE — 36415 COLL VENOUS BLD VENIPUNCTURE: CPT

## 2024-01-08 RX ORDER — ERGOCALCIFEROL 1.25 MG/1
50000 CAPSULE ORAL
COMMUNITY
Start: 2018-07-11 | End: 2024-02-05 | Stop reason: ALTCHOICE

## 2024-01-08 RX ORDER — CITALOPRAM 10 MG/1
10 TABLET ORAL DAILY
COMMUNITY
Start: 2018-09-27 | End: 2024-01-10 | Stop reason: ALTCHOICE

## 2024-01-08 ASSESSMENT — ENCOUNTER SYMPTOMS
FEVER: 0
ABDOMINAL PAIN: 0
NAUSEA: 0
COUGH: 0
ACTIVITY CHANGE: 0
SORE THROAT: 0
SHORTNESS OF BREATH: 0
DIARRHEA: 0
CHILLS: 0
FLANK PAIN: 0
APPETITE CHANGE: 0
CONSTITUTIONAL NEGATIVE: 1
RHINORRHEA: 0

## 2024-01-08 NOTE — ASSESSMENT & PLAN NOTE
- Pap record unable to be located. Recommend updated Pap be performed during pregnancy.  - cfDNA screening ordered today.  - NT screening within normal limits (1.2mm)  - Anatomy US in 8 weeks

## 2024-01-08 NOTE — PROGRESS NOTES
Maternal-Fetal Medicine Consultation Note  Initial Consultation    Subjective   Patient ID [id number]   Saba Salamanca is a 28 y.o. year-old  at 12w1d by 12-week US who presents for initial MFM consultation. She was referred by Dr. Hollingsworth for evaluation and management of the following high-risk issues in pregnancy.    1) HIV infection in pregnancy  Patient with unremarkable pregnancy in 2019. HIV not diagnosed during this pregnancy. She reports 1 partner throughout her life. Diagnosis came in early --transmission via her  of 9 years following lapse in medical therapies. Patient had been unaware of partner's diagnosis until personal diagnosis obtained. She has a lot of anxiety and shame surrounding her diagnosis.  She reports initiation of Biktarvy approximately 3 weeks ago. Viral load 110 in 2023. CD4 count in 2023 900+. She reports viral loads had been sustained 20 or less despite lack of therapy prior to Biktarvy start.     Patient presents without complaints today. She denies vaginal bleeding, pelvic pain or cramping, dysuria, hematuria, fevers, chills.    Past medical history: HIV+, Anxiety  Surgical history: Liposuction, Breast augmentation  Obstetric history:  OB History          5    Para   1    Term   1            AB   3    Living   1         SAB   3    IAB        Ectopic        Multiple        Live Births   1                  GYN history: Last 2017, updated Pap needed.  Social history:   Social History     Tobacco Use    Smoking status: Never    Smokeless tobacco: Never      Family history: Non-contributory    Review of Systems   Constitutional: Negative.  Negative for activity change, appetite change, chills and fever.   HENT:  Negative for congestion, rhinorrhea and sore throat.    Respiratory:  Negative for cough and shortness of breath.    Cardiovascular:  Negative for chest pain.   Gastrointestinal:  Negative for abdominal pain, diarrhea and nausea.   Genitourinary:   Negative for flank pain, vaginal bleeding and vaginal discharge.   All other systems reviewed and are negative.       Objective   Vitals:    01/08/24 1425   BP: 114/77     Physical Exam  Constitutional:       General: She is not in acute distress.     Appearance: Normal appearance. She is not ill-appearing or toxic-appearing.   HENT:      Head: Normocephalic.   Cardiovascular:      Rate and Rhythm: Normal rate and regular rhythm.   Pulmonary:      Effort: Pulmonary effort is normal. No respiratory distress.      Breath sounds: No stridor.   Abdominal:      General: Abdomen is flat. There is no distension.      Palpations: Abdomen is soft.      Tenderness: There is no abdominal tenderness. There is no guarding or rebound.   Genitourinary:     Comments: Deferred  Neurological:      General: No focal deficit present.      Mental Status: She is alert and oriented to person, place, and time.   Psychiatric:         Behavior: Behavior normal.         Thought Content: Thought content normal.         Judgment: Judgment normal.       FHR 157bpm by US performed 1/8/2024. NT WNL at 1.2mm See final report for additional details.     Assessment/Plan   HIV infection in mother during pregnancy, antepartum  The maternal and fetal effects of HIV were explained to the patient.    - The vertical transmission rate was discussed with and without therapy.  Currently, the vertical transmission rate is approximately 30% with NO therapy.  This may be decreased to 8% with continuous therapy from 14 weeks until delivery.  Some experts feel that with combination therapy, taken daily, the vertical transmission may be as low as 2-3%.    - Rates of transmission are decreased with improved control as demonstrated by CD4 and HIV viral load counts.  The patient's last counts were 12/2023 with viral load of 110.     - Risks, benefits, and side effects of antiretroviral therapy were discussed.    The following management recommendations were reviewed  with the patient.    - Plan for Infectious Disease consultation and follow up throughout pregnancy. Patient has established care with HIV provider.    - Recommend continued ART therapy (Biktarvy as prescribed).    - Will follow viral load, CD4, CD8 counts each trimester.    - Would NOT recommend amniocentesis, invasive labor monitoring or scalp blood sampling, or breast feeding in patients infected with HIV.    - In labor, recommend ZDV (AZT) 2 mg/kg run over one hour, followed by 1 mg/kg run over three hours before delivery.    - Recommend delivery at 38-39 weeks gestation pending viremic control by induction of labor without rupture of membranes or by elective  section.  If viral load less than 1000 copies, with concurrent ART without fail, would consider offering vaginal delivery without invasive monitoring.    - Recommend notification of pediatric team at the time of delivery for continued  management.    Due to patient's stigma associated with her diagnosis and notable anxiety, she requests delivery with Dr. Hollingsworth at [facility]. Patient is amenable to possible delivery at [facility] if [facility] not possible. Will reach out to pediatric providers to determine appropriate location for planned delivery in anticipation of  treatment need.    Supervision of high risk pregnancy in first trimester  - Pap record unable to be located. Recommend updated Pap be performed during pregnancy.  - cfDNA screening ordered today.  - NT screening within normal limits (1.2mm)  - Anatomy US in 8 weeks    Thank you for allowing us to participate in the care of your patient. We recommend continued co-management with follow-up visit with MFM in 8 weeks--concurrent with planned visit for anatomic survey.  Do not hesitate to reach out with additional concerns and/or questions that may arise.    Patient seen and discussed with Dr. Newton.    Buffy Bosch MD  Fellow, Maternal-Fetal Medicine

## 2024-01-08 NOTE — ASSESSMENT & PLAN NOTE
The maternal and fetal effects of HIV were explained to the patient.    - The vertical transmission rate was discussed with and without therapy.  Currently, the vertical transmission rate is approximately 30% with NO therapy.  This may be decreased to 8% with continuous therapy from 14 weeks until delivery.  Some experts feel that with combination therapy, taken daily, the vertical transmission may be as low as 2-3%.    - Rates of transmission are decreased with improved control as demonstrated by CD4 and HIV viral load counts.  The patient's last counts were 2023 with viral load of 110.     - Risks, benefits, and side effects of antiretroviral therapy were discussed.    The following management recommendations were reviewed with the patient.    - Plan for Infectious Disease consultation and follow up throughout pregnancy. Patient has established care with HIV provider.    - Recommend continued ART therapy (Biktarvy as prescribed).    - Will follow viral load, CD4, CD8 counts each trimester.    - Would NOT recommend amniocentesis, invasive labor monitoring or scalp blood sampling, or breast feeding in patients infected with HIV.    - In labor, recommend ZDV (AZT) 2 mg/kg run over one hour, followed by 1 mg/kg run over three hours before delivery.    - Recommend delivery at 38-39 weeks gestation pending viremic control by induction of labor without rupture of membranes or by elective  section.  If viral load less than 1000 copies, with concurrent ART without fail, would consider offering vaginal delivery without invasive monitoring.    - Recommend notification of pediatric team at the time of delivery for continued  management.    Due to patient's stigma associated with her diagnosis and notable anxiety, she requests delivery with Dr. Hollingsworth at [facility]. Patient is amenable to possible delivery at [facility] if [facility] not possible. Will reach out to pediatric providers to determine appropriate location  for planned delivery in anticipation of  treatment need.

## 2024-01-10 ENCOUNTER — INITIAL PRENATAL (OUTPATIENT)
Dept: OBSTETRICS AND GYNECOLOGY | Facility: CLINIC | Age: 29
End: 2024-01-10
Payer: COMMERCIAL

## 2024-01-10 VITALS — SYSTOLIC BLOOD PRESSURE: 108 MMHG | WEIGHT: 169 LBS | DIASTOLIC BLOOD PRESSURE: 70 MMHG | BODY MASS INDEX: 28.12 KG/M2

## 2024-01-10 DIAGNOSIS — F33.2 SEVERE EPISODE OF RECURRENT MAJOR DEPRESSIVE DISORDER, WITHOUT PSYCHOTIC FEATURES (MULTI): ICD-10-CM

## 2024-01-10 DIAGNOSIS — O21.9 NAUSEA AND VOMITING IN PREGNANCY PRIOR TO 22 WEEKS GESTATION (HHS-HCC): Primary | ICD-10-CM

## 2024-01-10 DIAGNOSIS — Z51.81 ENCOUNTER FOR THERAPEUTIC DRUG MONITORING: ICD-10-CM

## 2024-01-10 DIAGNOSIS — Z78.9 HEPATITIS B IMMUNE: ICD-10-CM

## 2024-01-10 DIAGNOSIS — F41.0 PANIC ANXIETY SYNDROME: ICD-10-CM

## 2024-01-10 DIAGNOSIS — O09.91 SUPERVISION OF HIGH RISK PREGNANCY IN FIRST TRIMESTER (HHS-HCC): ICD-10-CM

## 2024-01-10 DIAGNOSIS — Z3A.08 8 WEEKS GESTATION OF PREGNANCY (HHS-HCC): ICD-10-CM

## 2024-01-10 DIAGNOSIS — O98.719 HIV INFECTION IN MOTHER DURING PREGNANCY, ANTEPARTUM (HHS-HCC): ICD-10-CM

## 2024-01-10 DIAGNOSIS — R35.0 URINE FREQUENCY: ICD-10-CM

## 2024-01-10 DIAGNOSIS — Z11.3 ROUTINE SCREENING FOR STI (SEXUALLY TRANSMITTED INFECTION): ICD-10-CM

## 2024-01-10 DIAGNOSIS — O99.011 ANEMIA DURING PREGNANCY IN FIRST TRIMESTER (HHS-HCC): ICD-10-CM

## 2024-01-10 LAB
AMPHETAMINES UR QL SCN: NORMAL
BARBITURATES UR QL SCN: NORMAL
BENZODIAZ UR QL SCN: NORMAL
BZE UR QL SCN: NORMAL
CANNABINOIDS UR QL SCN: NORMAL
FENTANYL+NORFENTANYL UR QL SCN: NORMAL
OPIATES UR QL SCN: NORMAL
OXYCODONE+OXYMORPHONE UR QL SCN: NORMAL
PCP UR QL SCN: NORMAL
POC BLOOD, URINE: NEGATIVE
POC GLUCOSE, URINE: NEGATIVE MG/DL
POC LEUKOCYTES, URINE: NEGATIVE
POC NITRITE,URINE: NEGATIVE
POC PROTEIN, URINE: NEGATIVE MG/DL

## 2024-01-10 PROCEDURE — 76816 OB US FOLLOW-UP PER FETUS: CPT | Performed by: OBSTETRICS & GYNECOLOGY

## 2024-01-10 PROCEDURE — 0500F INITIAL PRENATAL CARE VISIT: CPT | Performed by: OBSTETRICS & GYNECOLOGY

## 2024-01-10 PROCEDURE — 80307 DRUG TEST PRSMV CHEM ANLYZR: CPT

## 2024-01-10 PROCEDURE — 87800 DETECT AGNT MULT DNA DIREC: CPT

## 2024-01-10 PROCEDURE — 87086 URINE CULTURE/COLONY COUNT: CPT

## 2024-01-10 RX ORDER — ONDANSETRON 4 MG/1
4 TABLET, FILM COATED ORAL EVERY 12 HOURS PRN
Qty: 14 TABLET | Refills: 0 | Status: SHIPPED | OUTPATIENT
Start: 2024-01-10 | End: 2024-02-05

## 2024-01-10 NOTE — PROGRESS NOTES
"Subjective   Patient ID [id number]   Saba Salamanca is a 28 y.o.  at 12w3d with a working estimated date of delivery of 2024, by Ultrasound who presents for a routine prenatal visit. She denies vaginal bleeding, leakage of fluid, decreased fetal movements, and contractions.    Her pregnancy is complicated by:  HIV in pregnancy.  See [facility] consult.    Objective   Physical Exam  Weight: 76.7 kg (169 lb), Pregravid BMI: Could not be calculated  Expected Total Weight Gain: Could not be calculated   BP: 108/70         Prenatal Labs  Urine dip:  Lab Results   Component Value Date    KETONESU NEGATIVE 2023     Lab Results   Component Value Date    HGB 11.9 (L) 2023    HCT 37.3 2023    ABO O 2023    HEPBSAG Nonreactive 2023     No results found for: \"PAPPA\", \"AFP\", \"HCG\", \"ESTRIOL\", \"INHBA\"    Imaging  The most recent ultrasound was performed on The most recent ultrasound study is not finalized with a study GA of The most recent ultrasound study is not finalized.  The most recent ultrasound study is not finalized  The most recent ultrasound study is not finalized    Assessment/Plan   Supervision high risk pregnancy HIV in pregnancy ultrasound confirms size equals dates.  Review [facility] recommendations and for delivery at [facility] or main [place].  Can continue shared care    Continue prenatal vitamin.  Labs reviewed.  Order placed for anatomy scan at 20 weeks.    Follow up in 4 weeks for a routine prenatal visit.  Prefers to deliver at [facility]  "

## 2024-01-11 LAB
BACTERIA UR CULT: NO GROWTH
C TRACH RRNA SPEC QL NAA+PROBE: NEGATIVE
N GONORRHOEA DNA SPEC QL PROBE+SIG AMP: NEGATIVE

## 2024-02-05 ENCOUNTER — LAB (OUTPATIENT)
Dept: LAB | Facility: LAB | Age: 29
End: 2024-02-05
Payer: COMMERCIAL

## 2024-02-05 ENCOUNTER — ROUTINE PRENATAL (OUTPATIENT)
Dept: OBSTETRICS AND GYNECOLOGY | Facility: CLINIC | Age: 29
End: 2024-02-05
Payer: COMMERCIAL

## 2024-02-05 VITALS — SYSTOLIC BLOOD PRESSURE: 116 MMHG | WEIGHT: 171 LBS | DIASTOLIC BLOOD PRESSURE: 62 MMHG | BODY MASS INDEX: 28.46 KG/M2

## 2024-02-05 DIAGNOSIS — Z78.9 HEPATITIS B IMMUNE: ICD-10-CM

## 2024-02-05 DIAGNOSIS — O99.012 ANEMIA DURING PREGNANCY IN SECOND TRIMESTER (HHS-HCC): ICD-10-CM

## 2024-02-05 DIAGNOSIS — Z3A.16 16 WEEKS GESTATION OF PREGNANCY (HHS-HCC): ICD-10-CM

## 2024-02-05 DIAGNOSIS — F41.0 PANIC ANXIETY SYNDROME: ICD-10-CM

## 2024-02-05 DIAGNOSIS — O09.91 SUPERVISION OF HIGH RISK PREGNANCY IN FIRST TRIMESTER (HHS-HCC): ICD-10-CM

## 2024-02-05 DIAGNOSIS — F33.2 SEVERE EPISODE OF RECURRENT MAJOR DEPRESSIVE DISORDER, WITHOUT PSYCHOTIC FEATURES (MULTI): ICD-10-CM

## 2024-02-05 DIAGNOSIS — O98.719 HIV INFECTION IN MOTHER DURING PREGNANCY, ANTEPARTUM (HHS-HCC): ICD-10-CM

## 2024-02-05 LAB
POC BLOOD, URINE: NEGATIVE
POC GLUCOSE, URINE: NEGATIVE MG/DL
POC LEUKOCYTES, URINE: NEGATIVE
POC NITRITE,URINE: NEGATIVE
POC PROTEIN, URINE: NEGATIVE MG/DL

## 2024-02-05 PROCEDURE — 0501F PRENATAL FLOW SHEET: CPT | Performed by: MIDWIFE

## 2024-02-05 PROCEDURE — 36415 COLL VENOUS BLD VENIPUNCTURE: CPT

## 2024-02-05 PROCEDURE — 82105 ALPHA-FETOPROTEIN SERUM: CPT

## 2024-02-05 NOTE — PROGRESS NOTES
"Subjective   Patient ID [id number]   Saba Salamanca is a 28 y.o.  at 16w1d with a working estimated date of delivery of 2024, by Ultrasound who presents for a routine prenatal visit. She denies vaginal bleeding, leakage of fluid, decreased fetal movements, or contractions.    Her pregnancy is complicated by:  HIV positive.  So is her .  Seeing infectious disease specialist. And scheduled with [facility].    Objective   Physical Exam  Weight: 77.6 kg (171 lb)  Expected Total Weight Gain: Could not be calculated   Pregravid BMI: Could not be calculated  BP: 116/62  Fetal Heart Rate: 153      Prenatal Labs  Urine dip:  Lab Results   Component Value Date    KETONESU NEGATIVE 2023       Lab Results   Component Value Date    HGB 11.9 (L) 2023    HCT 37.3 2023    ABO O 2023    HEPBSAG Nonreactive 2023     No results found for: \"PAPPA\", \"AFP\", \"HCG\", \"ESTRIOL\", \"INHBA\"  No results found for: \"GLUF\", \"GLUT1\", \"AZYLLQG7HB\", \"CTYQIDC7OY\"    Imaging  The most recent ultrasound was performed on   with a study GA of   and EFW of  .          Assessment/Plan   Diagnoses and all orders for this visit:  Anemia during pregnancy in second trimester  Hepatitis B immune  HIV infection in mother during pregnancy, antepartum  Panic anxiety syndrome  Severe episode of recurrent major depressive disorder, without psychotic features (CMS/HCC)  Supervision of high risk pregnancy in first trimester  16 weeks gestation of pregnancy  -     POCT UA (nonautomated) manually resulted  -     AFP, Maternal Serum; Future      Continue prenatal vitamin.  Labs reviewed.      AFP ordered. Warning s/sx reviewed  Follow up in 4 weeks for a routine prenatal visit.  "

## 2024-02-08 LAB
AFP INTERP SERPL-IMP: NORMAL
AFP INTERP SERPL-IMP: NORMAL
AFP MOM SERPL: 1.22
AFP SERPL-MCNC: 43 NG/ML
AGE AT DELIVERY: 28.3 YR
COMMENT,AFP MATERNAL SERUM: NORMAL
GA METHOD: NORMAL
GA: 16.2 WEEKS
IDDM PATIENT QL: NO
MULTIPLE PREGNANCY: NO
NEURAL TUBE DEFECT RISK FETUS: NORMAL %
RESULTS, AFP MATERNAL SERUM: NORMAL

## 2024-03-04 ENCOUNTER — ROUTINE PRENATAL (OUTPATIENT)
Dept: MATERNAL FETAL MEDICINE | Facility: CLINIC | Age: 29
End: 2024-03-04
Payer: COMMERCIAL

## 2024-03-04 ENCOUNTER — HOSPITAL ENCOUNTER (OUTPATIENT)
Dept: RADIOLOGY | Facility: CLINIC | Age: 29
Discharge: HOME | End: 2024-03-04
Payer: COMMERCIAL

## 2024-03-04 VITALS — SYSTOLIC BLOOD PRESSURE: 102 MMHG | BODY MASS INDEX: 28.86 KG/M2 | DIASTOLIC BLOOD PRESSURE: 71 MMHG | WEIGHT: 173.4 LBS

## 2024-03-04 DIAGNOSIS — O99.012 ANEMIA DURING PREGNANCY IN SECOND TRIMESTER (HHS-HCC): ICD-10-CM

## 2024-03-04 DIAGNOSIS — O09.92 SUPERVISION OF HIGH RISK PREGNANCY IN SECOND TRIMESTER (HHS-HCC): ICD-10-CM

## 2024-03-04 DIAGNOSIS — Z34.90 PREGNANT (HHS-HCC): ICD-10-CM

## 2024-03-04 DIAGNOSIS — O98.719 HIV INFECTION IN MOTHER DURING PREGNANCY, ANTEPARTUM (HHS-HCC): Primary | ICD-10-CM

## 2024-03-04 DIAGNOSIS — N89.8 VAGINAL DISCHARGE DURING PREGNANCY IN SECOND TRIMESTER (HHS-HCC): ICD-10-CM

## 2024-03-04 DIAGNOSIS — O26.892 VAGINAL DISCHARGE DURING PREGNANCY IN SECOND TRIMESTER (HHS-HCC): ICD-10-CM

## 2024-03-04 PROCEDURE — 87661 TRICHOMONAS VAGINALIS AMPLIF: CPT | Mod: 59 | Performed by: STUDENT IN AN ORGANIZED HEALTH CARE EDUCATION/TRAINING PROGRAM

## 2024-03-04 PROCEDURE — 76805 OB US >/= 14 WKS SNGL FETUS: CPT | Performed by: OBSTETRICS & GYNECOLOGY

## 2024-03-04 PROCEDURE — 76805 OB US >/= 14 WKS SNGL FETUS: CPT

## 2024-03-04 PROCEDURE — 87800 DETECT AGNT MULT DNA DIREC: CPT | Performed by: STUDENT IN AN ORGANIZED HEALTH CARE EDUCATION/TRAINING PROGRAM

## 2024-03-04 PROCEDURE — 99214 OFFICE O/P EST MOD 30 MIN: CPT | Performed by: STUDENT IN AN ORGANIZED HEALTH CARE EDUCATION/TRAINING PROGRAM

## 2024-03-04 ASSESSMENT — ENCOUNTER SYMPTOMS
ABDOMINAL PAIN: 0
ACTIVITY CHANGE: 0
NAUSEA: 0
SHORTNESS OF BREATH: 0
CONSTITUTIONAL NEGATIVE: 1
FEVER: 0
DIARRHEA: 0
CHILLS: 0
APPETITE CHANGE: 0
SORE THROAT: 0
FLANK PAIN: 0
RHINORRHEA: 0
COUGH: 0

## 2024-03-04 NOTE — ASSESSMENT & PLAN NOTE
- Repeat viral load and CD4 count ordered with today's visit.   - Will plan to repeat labs also at 28 and 36 week visits  - Patient strongly desires delivery at [facility]. Discussed with nursing and L&D leadership.  will need evaluation and treatment. Will need to coordinate delivery and medication availability closer to time of delivery.

## 2024-03-04 NOTE — PROGRESS NOTES
Maternal-Fetal Medicine Consultation Note  Initial Consultation    Subjective   Patient ID [id number]   Saba Salamanca is a 28 y.o. year-old  at 20w1d by 12-week US who presents for follow-up M consultation. She follows with [facility] for the following high-risk issues    1) HIV infection in pregnancy  See full history as stated in initial consultation.  She reports continued daily compliance with Biktarvy. She states that her  is also compliant with his daily dosing.     Patient presents with complaint of malodorous vaginal discharge. She denies pelvic pain, loss of fluid and vaginal bleeding. AFV WNL on imaging today.  She requests STI screening. She denies vaginal bleeding, pelvic pain or cramping, dysuria, hematuria, fevers, chills.    Past medical history: HIV+, Anxiety  Surgical history: Liposuction, Breast augmentation  Obstetric history:  OB History          6    Para   2    Term   2            AB   3    Living   2         SAB   3    IAB        Ectopic        Multiple        Live Births   2                  GYN history: Last , updated Pap needed.  Social history:   Social History     Tobacco Use    Smoking status: Never    Smokeless tobacco: Never      Family history: Non-contributory    Review of Systems   Constitutional: Negative.  Negative for activity change, appetite change, chills and fever.   HENT:  Negative for congestion, rhinorrhea and sore throat.    Respiratory:  Negative for cough and shortness of breath.    Cardiovascular:  Negative for chest pain.   Gastrointestinal:  Negative for abdominal pain, diarrhea and nausea.   Genitourinary:  Negative for flank pain, vaginal bleeding and vaginal discharge.   All other systems reviewed and are negative.       Objective   Vitals:    24 1503   BP: 102/71     Physical Exam  Constitutional:       General: She is not in acute distress.     Appearance: Normal appearance. She is not ill-appearing or toxic-appearing.   HENT:       Head: Normocephalic.   Cardiovascular:      Rate and Rhythm: Normal rate and regular rhythm.   Pulmonary:      Effort: Pulmonary effort is normal. No respiratory distress.      Breath sounds: No stridor.   Abdominal:      General: Abdomen is flat. There is no distension.      Palpations: Abdomen is soft.      Tenderness: There is no abdominal tenderness. There is no guarding or rebound.   Genitourinary:     Comments: Deferred  Neurological:      General: No focal deficit present.      Mental Status: She is alert and oriented to person, place, and time.   Psychiatric:         Behavior: Behavior normal.         Thought Content: Thought content normal.         Judgment: Judgment normal.       US (3/4/2024): FHR 157bpm. EFW at 18th percentile. Anatomy incomplete. Scheduled for follow up to complete views. See full report for additional details.      Assessment/Plan   Supervision of high risk pregnancy in second trimester  - Cell-free DNA risk-reducing, patient aware of results  - Anatomy attempted today but unable to be completed. To schedule follow up in 1-2 weeks to complete views.     HIV infection in mother during pregnancy, antepartum  - Repeat viral load and CD4 count ordered with today's visit.   - Will plan to repeat labs also at 28 and 36 week visits  - Patient strongly desires delivery at [facility]. Discussed with nursing and L&D leadership.  will need evaluation and treatment. Will need to coordinate delivery and medication availability closer to time of delivery.     Malodorous vaginal discharge  - STI screening sent. Will call with results and treat as indicated.    Thank you for allowing us to participate in the care of your patient. We recommend continued co-management with follow-up visit with MFM in 8 weeks--concurrent with planned visit for fetal growth assessment. Will contact patient with lab results via [employer] when available.   Do not hesitate to reach out with additional concerns and/or  questions that may arise.    Patient seen and discussed with Dr. Newton.    Buffy Bosch MD  Fellow, Maternal-Fetal Medicine

## 2024-03-05 LAB
C TRACH RRNA SPEC QL NAA+PROBE: NEGATIVE
N GONORRHOEA DNA SPEC QL PROBE+SIG AMP: NEGATIVE
T VAGINALIS RRNA SPEC QL NAA+PROBE: NEGATIVE

## 2024-03-07 ENCOUNTER — LAB (OUTPATIENT)
Dept: LAB | Facility: LAB | Age: 29
End: 2024-03-07
Payer: COMMERCIAL

## 2024-03-07 DIAGNOSIS — O98.719 HIV INFECTION IN MOTHER DURING PREGNANCY, ANTEPARTUM (HHS-HCC): ICD-10-CM

## 2024-03-07 LAB
BASOPHILS # BLD AUTO: 0.03 X10*3/UL (ref 0–0.1)
BASOPHILS NFR BLD AUTO: 0.3 %
EOSINOPHIL # BLD AUTO: 0.1 X10*3/UL (ref 0–0.7)
EOSINOPHIL NFR BLD AUTO: 0.9 %
ERYTHROCYTE [DISTWIDTH] IN BLOOD BY AUTOMATED COUNT: 16.1 % (ref 11.5–14.5)
HCT VFR BLD AUTO: 33.8 % (ref 36–46)
HGB BLD-MCNC: 10.9 G/DL (ref 12–16)
IMM GRANULOCYTES # BLD AUTO: 0.06 X10*3/UL (ref 0–0.7)
IMM GRANULOCYTES NFR BLD AUTO: 0.5 % (ref 0–0.9)
LYMPHOCYTES # BLD AUTO: 2.14 X10*3/UL (ref 1.2–4.8)
LYMPHOCYTES NFR BLD AUTO: 19.6 %
MCH RBC QN AUTO: 23.9 PG (ref 26–34)
MCHC RBC AUTO-ENTMCNC: 32.2 G/DL (ref 32–36)
MCV RBC AUTO: 74 FL (ref 80–100)
MONOCYTES # BLD AUTO: 0.86 X10*3/UL (ref 0.1–1)
MONOCYTES NFR BLD AUTO: 7.9 %
NEUTROPHILS # BLD AUTO: 7.72 X10*3/UL (ref 1.2–7.7)
NEUTROPHILS NFR BLD AUTO: 70.8 %
NRBC BLD-RTO: 0 /100 WBCS (ref 0–0)
PLATELET # BLD AUTO: 313 X10*3/UL (ref 150–450)
RBC # BLD AUTO: 4.57 X10*6/UL (ref 4–5.2)
WBC # BLD AUTO: 10.9 X10*3/UL (ref 4.4–11.3)

## 2024-03-07 PROCEDURE — 88185 FLOWCYTOMETRY/TC ADD-ON: CPT

## 2024-03-07 PROCEDURE — 88187 FLOWCYTOMETRY/READ 2-8: CPT | Performed by: STUDENT IN AN ORGANIZED HEALTH CARE EDUCATION/TRAINING PROGRAM

## 2024-03-07 PROCEDURE — 88184 FLOWCYTOMETRY/ TC 1 MARKER: CPT

## 2024-03-07 PROCEDURE — 36415 COLL VENOUS BLD VENIPUNCTURE: CPT

## 2024-03-07 PROCEDURE — 85025 COMPLETE CBC W/AUTO DIFF WBC: CPT

## 2024-03-07 PROCEDURE — 87536 HIV-1 QUANT&REVRSE TRNSCRPJ: CPT

## 2024-03-08 LAB
CD3+CD4+ CELLS # BLD: 0.88 X10E9/L
CD3+CD4+ CELLS # BLD: 877 /MM3
CD3+CD4+ CELLS NFR BLD: 41 %
CD3+CD4+ CELLS/CD3+CD8+ CLL BLD: 0.87 %
CD3+CD8+ CELLS # BLD: 1.01 X10E9/L
CD3+CD8+ CELLS NFR BLD: 47 %
HIV1 RNA # PLAS NAA DL=20: NOT DETECTED {COPIES}/ML
HIV1 RNA SPEC NAA+PROBE-LOG#: NORMAL {LOG_COPIES}/ML
LYMPHOCYTES # SPEC AUTO: 2.14 X10*3/UL

## 2024-03-12 LAB
CD19 CELLS # BLD: 0.21 X10E9/L
CD19 CELLS NFR BLD: 10 %
CD3 CELLS # BLD: 1.86 X10E9/L
CD3 CELLS NFR SPEC: 87 %
CD3+CD4+ CELLS # BLD: 0.86 X10E9/L
CD3+CD4+ CELLS # BLD: 856 /MM3
CD3+CD4+ CELLS NFR BLD: 40 %
CD3+CD4+ CELLS/CD3+CD8+ CLL BLD: 0.82 %
CD3+CD4-CD8-CD45+ CELLS NFR BLD: 2 %
CD3+CD8+ CELLS # BLD: 1.05 X10E9/L
CD3+CD8+ CELLS NFR BLD: 49 %
CD3-CD16+CD56+ CELLS # BLD: 0.06 X10E9/L
CD3-CD16+CD56+ CELLS NFR BLD: 3 %
FLOW CYTOMETRY SPECIALIST REVIEW: ABNORMAL
LYMPHOCYTES # SPEC AUTO: 2.14 X10*3/UL
PATH REVIEW, IMMUNODEFICIENCY PROFILE: ABNORMAL

## 2024-03-13 ENCOUNTER — ROUTINE PRENATAL (OUTPATIENT)
Dept: OBSTETRICS AND GYNECOLOGY | Facility: CLINIC | Age: 29
End: 2024-03-13
Payer: COMMERCIAL

## 2024-03-13 VITALS — WEIGHT: 174 LBS | SYSTOLIC BLOOD PRESSURE: 108 MMHG | BODY MASS INDEX: 28.96 KG/M2 | DIASTOLIC BLOOD PRESSURE: 60 MMHG

## 2024-03-13 DIAGNOSIS — O98.719 HIV INFECTION IN MOTHER DURING PREGNANCY, ANTEPARTUM (HHS-HCC): ICD-10-CM

## 2024-03-13 DIAGNOSIS — Z3A.21 21 WEEKS GESTATION OF PREGNANCY (HHS-HCC): ICD-10-CM

## 2024-03-13 DIAGNOSIS — Z78.9 HEPATITIS B IMMUNE: ICD-10-CM

## 2024-03-13 DIAGNOSIS — F33.2 SEVERE EPISODE OF RECURRENT MAJOR DEPRESSIVE DISORDER, WITHOUT PSYCHOTIC FEATURES (MULTI): ICD-10-CM

## 2024-03-13 DIAGNOSIS — O09.92 SUPERVISION OF HIGH RISK PREGNANCY IN SECOND TRIMESTER (HHS-HCC): ICD-10-CM

## 2024-03-13 DIAGNOSIS — O99.012 ANEMIA DURING PREGNANCY IN SECOND TRIMESTER (HHS-HCC): ICD-10-CM

## 2024-03-13 DIAGNOSIS — F41.0 PANIC ANXIETY SYNDROME: ICD-10-CM

## 2024-03-13 PROCEDURE — 0501F PRENATAL FLOW SHEET: CPT | Performed by: OBSTETRICS & GYNECOLOGY

## 2024-03-13 NOTE — PROGRESS NOTES
"Subjective   Patient ID [id number]   Saba Salamanca is a 29 y.o.  at 21w3d with a working estimated date of delivery of 2024, by Ultrasound who presents for a routine prenatal visit. She denies vaginal bleeding, leakage of fluid, decreased fetal movements, or contractions.    Her pregnancy is complicated by:  HIV in pregnancy.  Seeing MFM.  Plan is to deliver at [facility]    Objective   Physical Exam  Weight: 78.9 kg (174 lb)  Expected Total Weight Gain: Could not be calculated   Pregravid BMI: Could not be calculated  BP: 108/60         Prenatal Labs  Urine dip:  Lab Results   Component Value Date    KETONESU NEGATIVE 2023       Lab Results   Component Value Date    HGB 10.9 (L) 2024    HCT 33.8 (L) 2024    ABO O 2023    HEPBSAG Nonreactive 2023     No results found for: \"PAPPA\", \"AFP\", \"HCG\", \"ESTRIOL\", \"INHBA\"  No results found for: \"GLUF\", \"GLUT1\", \"KXLJPHY8UB\", \"CTLXICC2ZZ\"    Imaging  The most recent ultrasound was performed on The most recent ultrasound study is not finalized with a study GA of The most recent ultrasound study is not finalized and EFW of The most recent ultrasound study is not finalized.  The most recent ultrasound study is not finalized  The most recent ultrasound study is not finalized    Assessment/Plan       Continue prenatal vitamin.  Labs reviewed.  Rhogam not indicated  GTT 24 weeks.  Anemia, recommend iron  Follow up in 4 weeks for a routine prenatal visit.  Has completion of anatomy scan tomorrow  "

## 2024-03-14 ENCOUNTER — HOSPITAL ENCOUNTER (OUTPATIENT)
Dept: RADIOLOGY | Facility: CLINIC | Age: 29
Discharge: HOME | End: 2024-03-14
Payer: COMMERCIAL

## 2024-03-14 DIAGNOSIS — Z32.01 PREGNANCY TEST POSITIVE (HHS-HCC): ICD-10-CM

## 2024-03-14 DIAGNOSIS — Z21: ICD-10-CM

## 2024-03-14 DIAGNOSIS — O98.719: ICD-10-CM

## 2024-03-14 PROCEDURE — 76816 OB US FOLLOW-UP PER FETUS: CPT | Performed by: STUDENT IN AN ORGANIZED HEALTH CARE EDUCATION/TRAINING PROGRAM

## 2024-03-14 PROCEDURE — 76816 OB US FOLLOW-UP PER FETUS: CPT

## 2024-04-09 ENCOUNTER — ROUTINE PRENATAL (OUTPATIENT)
Dept: OBSTETRICS AND GYNECOLOGY | Facility: CLINIC | Age: 29
End: 2024-04-09
Payer: COMMERCIAL

## 2024-04-09 ENCOUNTER — LAB (OUTPATIENT)
Dept: LAB | Facility: LAB | Age: 29
End: 2024-04-09
Payer: COMMERCIAL

## 2024-04-09 VITALS — BODY MASS INDEX: 29.12 KG/M2 | WEIGHT: 175 LBS | SYSTOLIC BLOOD PRESSURE: 112 MMHG | DIASTOLIC BLOOD PRESSURE: 68 MMHG

## 2024-04-09 DIAGNOSIS — Z78.9 HEPATITIS B IMMUNE: ICD-10-CM

## 2024-04-09 DIAGNOSIS — Z3A.25 25 WEEKS GESTATION OF PREGNANCY (HHS-HCC): ICD-10-CM

## 2024-04-09 DIAGNOSIS — O09.92 SUPERVISION OF HIGH RISK PREGNANCY IN SECOND TRIMESTER (HHS-HCC): ICD-10-CM

## 2024-04-09 DIAGNOSIS — F41.0 PANIC ANXIETY SYNDROME: ICD-10-CM

## 2024-04-09 DIAGNOSIS — F33.2 SEVERE EPISODE OF RECURRENT MAJOR DEPRESSIVE DISORDER, WITHOUT PSYCHOTIC FEATURES (MULTI): ICD-10-CM

## 2024-04-09 DIAGNOSIS — O98.719 HIV INFECTION IN MOTHER DURING PREGNANCY, ANTEPARTUM (HHS-HCC): ICD-10-CM

## 2024-04-09 DIAGNOSIS — O99.012 ANEMIA DURING PREGNANCY IN SECOND TRIMESTER (HHS-HCC): ICD-10-CM

## 2024-04-09 LAB
HCT VFR BLD AUTO: 31.7 % (ref 36–46)
HGB BLD-MCNC: 10 G/DL (ref 12–16)
POC BLOOD, URINE: NEGATIVE
POC GLUCOSE, URINE: ABNORMAL MG/DL
POC LEUKOCYTES, URINE: NEGATIVE
POC NITRITE,URINE: NEGATIVE
POC PROTEIN, URINE: NEGATIVE MG/DL

## 2024-04-09 PROCEDURE — 85014 HEMATOCRIT: CPT

## 2024-04-09 PROCEDURE — 36415 COLL VENOUS BLD VENIPUNCTURE: CPT

## 2024-04-09 PROCEDURE — 82947 ASSAY GLUCOSE BLOOD QUANT: CPT

## 2024-04-09 PROCEDURE — 85018 HEMOGLOBIN: CPT

## 2024-04-09 PROCEDURE — 0501F PRENATAL FLOW SHEET: CPT | Performed by: OBSTETRICS & GYNECOLOGY

## 2024-04-09 NOTE — PROGRESS NOTES
"Subjective   Patient ID [id number]   Saba Salamanca is a 29 y.o.  at 25w2d with a working estimated date of delivery of 2024, by Ultrasound who presents for a routine prenatal visit. She denies vaginal bleeding, leakage of fluid, decreased fetal movements, or contractions.    Her pregnancy is complicated by:  HIV in pregnancy, recommended delivery at [facility] per [facility]    Objective   Physical Exam  Weight: 79.4 kg (175 lb)  Expected Total Weight Gain: Could not be calculated   Pregravid BMI: Could not be calculated  BP: 112/68         Prenatal Labs  Urine dip:  Lab Results   Component Value Date    KETONESU NEGATIVE 2023       Lab Results   Component Value Date    HGB 10.9 (L) 2024    HCT 33.8 (L) 2024    ABO O 2023    HEPBSAG Nonreactive 2023     No results found for: \"PAPPA\", \"AFP\", \"HCG\", \"ESTRIOL\", \"INHBA\"  No results found for: \"GLUF\", \"GLUT1\", \"WYJBZGA1RJ\", \"PLBXLAX4MX\"    Imaging  The most recent ultrasound was performed on   with a study GA of   and EFW of  .          Assessment/Plan       Continue prenatal vitamin.  Labs reviewed.  Rhogam  not indicated  GTT ordered.  Transfer care to [facility] for T3  Follow up in 4 weeks for a routine prenatal visit.  "

## 2024-04-10 LAB — GLUCOSE 1H P 50 G GLC PO SERPL-MCNC: 99 MG/DL

## 2024-04-17 ENCOUNTER — APPOINTMENT (OUTPATIENT)
Dept: OBSTETRICS AND GYNECOLOGY | Facility: CLINIC | Age: 29
End: 2024-04-17
Payer: COMMERCIAL

## 2024-04-26 ASSESSMENT — ENCOUNTER SYMPTOMS
SORE THROAT: 0
APPETITE CHANGE: 0
ABDOMINAL PAIN: 0
CHILLS: 0
ACTIVITY CHANGE: 0
DIARRHEA: 0
NAUSEA: 0
FEVER: 0
CONSTITUTIONAL NEGATIVE: 1
FLANK PAIN: 0
SHORTNESS OF BREATH: 0
RHINORRHEA: 0
COUGH: 0

## 2024-04-27 NOTE — PROGRESS NOTES
Maternal-Fetal Medicine Consultation Note  Initial Consultation    Subjective   Patient ID [id number]   Saba Salamanca is a 29 y.o. year-old  at 28w1d by 12-week US who presents for follow-up MFM consultation. She follows with [facility] for the following high-risk issues    1) HIV infection in pregnancy  See full history as stated in initial consultation.  She reports continued daily compliance with Biktarvy. She states that her  is also compliant with his daily dosing.     She presents with complaints of lower back pain and pelvic pressure at today's visit. She denies contractions. She also denies vaginal bleeding, pelvic pain or cramping, dysuria, hematuria, fevers, chills.    She reports that Dr. Hollingsworth has requested transfer of patient's care for the third trimester. Patient intends to delivery at [facility] and, therefore, requests to establish prenatal care with [facility] delivering provider.    Past medical history: HIV+, Anxiety  Surgical history: Liposuction, Breast augmentation  Obstetric history:  OB History          6    Para   2    Term   2            AB   3    Living   2         SAB   3    IAB        Ectopic        Multiple        Live Births   2                  GYN history: Last , updated Pap needed.  Social history:   Social History     Tobacco Use    Smoking status: Never    Smokeless tobacco: Never      Family history: Non-contributory    Review of Systems   Constitutional: Negative.  Negative for activity change, appetite change, chills and fever.   HENT:  Negative for congestion, rhinorrhea and sore throat.    Respiratory:  Negative for cough and shortness of breath.    Cardiovascular:  Negative for chest pain.   Gastrointestinal:  Negative for abdominal pain, diarrhea and nausea.   Genitourinary:  Negative for flank pain, vaginal bleeding and vaginal discharge.   Musculoskeletal:  Positive for back pain.        Lower back discomfort   All other systems reviewed and are  negative.     Objective   Vitals:    24 1400   BP: 90/62     Physical Exam  Constitutional:       General: She is not in acute distress.     Appearance: Normal appearance. She is not ill-appearing or toxic-appearing.   HENT:      Head: Normocephalic.   Cardiovascular:      Rate and Rhythm: Normal rate and regular rhythm.   Pulmonary:      Effort: Pulmonary effort is normal. No respiratory distress.      Breath sounds: No stridor.   Abdominal:      General: Abdomen is flat. There is no distension.      Palpations: Abdomen is soft.      Tenderness: There is no abdominal tenderness. There is no guarding or rebound.   Genitourinary:     Comments: Deferred  Neurological:      General: No focal deficit present.      Mental Status: She is alert and oriented to person, place, and time.   Psychiatric:         Behavior: Behavior normal.         Thought Content: Thought content normal.         Judgment: Judgment normal.      bpm.      Latest Reference Range & Units 24 17:44   HEMOGLOBIN 12.0 - 16.0 g/dL 10.0 (L)   HEMATOCRIT 36.0 - 46.0 % 31.7 (L)   (L): Data is abnormally low     Latest Reference Range & Units 24 15:40   CD3+CD4+ Absolute 0.350 - 2.740 x10E9/L  0.350 - 2.740 x10E9/L 0.856  0.877      24 15:40 24 16:13   HIV RNA, QUANTITATIVE, PCR Undetectable In process       Assessment/Plan   Saba Salamanca is a 29 y.o.  at 28w1d who presents for follow-up [facility] consultation.    HIV infection in mother during pregnancy, antepartum ([facility])  - Repeat viral load and CD4 count ordered with today's visit.   - Repeat growth planned at 30 weeks (in 2 weeks at time of next follow up)  - Patient strongly desires delivery at [facility]. Discussed with nursing and L&D leadership.  will need evaluation and treatment. Coordination has been initiated (Dr. Miramontes, pediatrics and Jimi Barajas, PharmD) with plans for delivery at [facility].     Anemia in pregnancy ([facility])  - Oral iron  supplementation ordered today    Supervision of high risk pregnancy in third trimester ([facility])  - Glucose screening completed with normal results  - Third remainder of third trimester labs ordered today.   - Tdap not discussed at today's visit, will readdress at follow up in 2 weeks.   - Informed patient that MFM providers are not delivering providers at [facility]. She requests establishment of care with provider with delivering privileges at [facility]. Patient previously scheduled with Dr. Dank Hewitt though visit canceled (unclear). Will work to re-schedule to establish routine care.   - Patient also inquires about donor breast milk programs. Information for lactation and breastfeeding services provided at today's visit.     Thank you for allowing us to participate in the care of your patient. We recommend continued co-management with follow-up visit with MFM in 2 weeks for follow-up visit and growth ultrasound. Will contact patient with lab results via [employer] when available.   Do not hesitate to reach out with additional concerns and/or questions that may arise.    Patient seen and discussed with Dr. Newton.    Buffy Bosch MD  Fellow, Maternal-Fetal Medicine

## 2024-04-27 NOTE — ASSESSMENT & PLAN NOTE
- Repeat viral load and CD4 count ordered with today's visit.   - Repeat growth planned at 30 weeks (in 2 weeks at time of next follow up)  - Patient strongly desires delivery at [facility]. Discussed with nursing and L&D leadership.  will need evaluation and treatment. Coordination has been initiated (Dr. Miramontes, pediatrics and Jimi Barajas, PharmD) with plans for delivery at [facility].

## 2024-04-29 ENCOUNTER — ROUTINE PRENATAL (OUTPATIENT)
Dept: MATERNAL FETAL MEDICINE | Facility: CLINIC | Age: 29
End: 2024-04-29
Payer: COMMERCIAL

## 2024-04-29 ENCOUNTER — LAB (OUTPATIENT)
Dept: LAB | Facility: LAB | Age: 29
End: 2024-04-29
Payer: COMMERCIAL

## 2024-04-29 VITALS — BODY MASS INDEX: 29.29 KG/M2 | WEIGHT: 176 LBS | DIASTOLIC BLOOD PRESSURE: 62 MMHG | SYSTOLIC BLOOD PRESSURE: 90 MMHG

## 2024-04-29 DIAGNOSIS — O98.719 HIV INFECTION IN MOTHER DURING PREGNANCY, ANTEPARTUM (HHS-HCC): ICD-10-CM

## 2024-04-29 DIAGNOSIS — O09.93 SUPERVISION OF HIGH RISK PREGNANCY IN THIRD TRIMESTER (HHS-HCC): ICD-10-CM

## 2024-04-29 DIAGNOSIS — Z3A.28 28 WEEKS GESTATION OF PREGNANCY (HHS-HCC): ICD-10-CM

## 2024-04-29 DIAGNOSIS — O98.719 HIV INFECTION IN MOTHER DURING PREGNANCY, ANTEPARTUM (HHS-HCC): Primary | ICD-10-CM

## 2024-04-29 DIAGNOSIS — O99.013 ANEMIA DURING PREGNANCY IN THIRD TRIMESTER (HHS-HCC): ICD-10-CM

## 2024-04-29 LAB
BASOPHILS # BLD AUTO: 0.03 X10*3/UL (ref 0–0.1)
BASOPHILS NFR BLD AUTO: 0.3 %
EOSINOPHIL # BLD AUTO: 0.08 X10*3/UL (ref 0–0.7)
EOSINOPHIL NFR BLD AUTO: 0.8 %
ERYTHROCYTE [DISTWIDTH] IN BLOOD BY AUTOMATED COUNT: 15.2 % (ref 11.5–14.5)
HCT VFR BLD AUTO: 31.2 % (ref 36–46)
HGB BLD-MCNC: 10 G/DL (ref 12–16)
IMM GRANULOCYTES # BLD AUTO: 0.05 X10*3/UL (ref 0–0.7)
IMM GRANULOCYTES NFR BLD AUTO: 0.5 % (ref 0–0.9)
LYMPHOCYTES # BLD AUTO: 2.09 X10*3/UL (ref 1.2–4.8)
LYMPHOCYTES NFR BLD AUTO: 21.4 %
MCH RBC QN AUTO: 23.9 PG (ref 26–34)
MCHC RBC AUTO-ENTMCNC: 32.1 G/DL (ref 32–36)
MCV RBC AUTO: 75 FL (ref 80–100)
MONOCYTES # BLD AUTO: 1.02 X10*3/UL (ref 0.1–1)
MONOCYTES NFR BLD AUTO: 10.4 %
NEUTROPHILS # BLD AUTO: 6.5 X10*3/UL (ref 1.2–7.7)
NEUTROPHILS NFR BLD AUTO: 66.6 %
NRBC BLD-RTO: 0 /100 WBCS (ref 0–0)
PLATELET # BLD AUTO: 300 X10*3/UL (ref 150–450)
POC APPEARANCE, URINE: CLEAR
POC BILIRUBIN, URINE: NEGATIVE
POC BLOOD, URINE: NEGATIVE
POC COLOR, URINE: YELLOW
POC GLUCOSE, URINE: NEGATIVE MG/DL
POC KETONES, URINE: NEGATIVE MG/DL
POC LEUKOCYTES, URINE: ABNORMAL
POC NITRITE,URINE: NEGATIVE
POC PH, URINE: 7 PH
POC PROTEIN, URINE: ABNORMAL MG/DL
POC SPECIFIC GRAVITY, URINE: >=1.03
POC UROBILINOGEN, URINE: 0.2 EU/DL
RBC # BLD AUTO: 4.18 X10*6/UL (ref 4–5.2)
WBC # BLD AUTO: 9.8 X10*3/UL (ref 4.4–11.3)

## 2024-04-29 PROCEDURE — 86900 BLOOD TYPING SEROLOGIC ABO: CPT

## 2024-04-29 PROCEDURE — 86901 BLOOD TYPING SEROLOGIC RH(D): CPT

## 2024-04-29 PROCEDURE — 87536 HIV-1 QUANT&REVRSE TRNSCRPJ: CPT

## 2024-04-29 PROCEDURE — 99214 OFFICE O/P EST MOD 30 MIN: CPT | Performed by: STUDENT IN AN ORGANIZED HEALTH CARE EDUCATION/TRAINING PROGRAM

## 2024-04-29 PROCEDURE — 86850 RBC ANTIBODY SCREEN: CPT

## 2024-04-29 PROCEDURE — 88185 FLOWCYTOMETRY/TC ADD-ON: CPT

## 2024-04-29 PROCEDURE — 86780 TREPONEMA PALLIDUM: CPT

## 2024-04-29 PROCEDURE — 88184 FLOWCYTOMETRY/ TC 1 MARKER: CPT

## 2024-04-29 PROCEDURE — 85025 COMPLETE CBC W/AUTO DIFF WBC: CPT

## 2024-04-29 PROCEDURE — 81003 URINALYSIS AUTO W/O SCOPE: CPT | Mod: QW | Performed by: STUDENT IN AN ORGANIZED HEALTH CARE EDUCATION/TRAINING PROGRAM

## 2024-04-29 PROCEDURE — 36415 COLL VENOUS BLD VENIPUNCTURE: CPT

## 2024-04-29 RX ORDER — FERROUS SULFATE 325(65) MG
325 TABLET, DELAYED RELEASE (ENTERIC COATED) ORAL
Qty: 30 TABLET | Refills: 11 | Status: SHIPPED | OUTPATIENT
Start: 2024-04-29 | End: 2024-05-15 | Stop reason: SDUPTHER

## 2024-04-29 ASSESSMENT — EDINBURGH POSTNATAL DEPRESSION SCALE (EPDS)
I HAVE BLAMED MYSELF UNNECESSARILY WHEN THINGS WENT WRONG: NOT VERY OFTEN
I HAVE BEEN SO UNHAPPY THAT I HAVE HAD DIFFICULTY SLEEPING: NOT VERY OFTEN
I HAVE BEEN SO UNHAPPY THAT I HAVE BEEN CRYING: ONLY OCCASIONALLY
THINGS HAVE BEEN GETTING ON TOP OF ME: YES, SOMETIMES I HAVEN'T BEEN COPING AS WELL AS USUAL
I HAVE FELT SAD OR MISERABLE: NOT VERY OFTEN
THE THOUGHT OF HARMING MYSELF HAS OCCURRED TO ME: NEVER
I HAVE FELT SCARED OR PANICKY FOR NO GOOD REASON: YES, SOMETIMES
I HAVE BEEN ABLE TO LAUGH AND SEE THE FUNNY SIDE OF THINGS: AS MUCH AS I ALWAYS COULD
TOTAL SCORE: 11
I HAVE BEEN ANXIOUS OR WORRIED FOR NO GOOD REASON: YES, SOMETIMES
I HAVE LOOKED FORWARD WITH ENJOYMENT TO THINGS: RATHER LESS THAN I USED TO

## 2024-04-29 ASSESSMENT — ENCOUNTER SYMPTOMS: BACK PAIN: 1

## 2024-04-29 NOTE — Clinical Note
Hi Dr. Hewitt, I see that this patient had been previously scheduled with you to establish routine care (from Dr. Hollingsworth). However, this visit now seems to have been canceled (by patient). I believe that she was unclear on where she would be getting follow up after Dr. Hollingsworth recommended transfer.  After notifying her that the MFM members did not delivery at [facility], she requested to establish care with a provider that would be capable of being present for her delivery. She is interested in rescheduling this prenatal visit with you...to whom should I forward to arrange for reschedule? Thank you!

## 2024-04-29 NOTE — ASSESSMENT & PLAN NOTE
- Glucose screening completed with normal results  - Third remainder of third trimester labs ordered today.   - Tdap not discussed at today's visit, will readdress at follow up in 2 weeks.   - Informed patient that MFM providers are not delivering providers at [facility]. She requests establishment of care with provider with delivering privileges at [facility]. Patient previously scheduled with Dr. Dank Hewitt though visit canceled (unclear). Will work to re-schedule to establish routine care.   - Patient also inquires about donor breast milk programs. Information for lactation and breastfeeding services provided at today's visit.

## 2024-04-30 ENCOUNTER — TELEPHONE (OUTPATIENT)
Dept: LACTATION | Facility: CLINIC | Age: 29
End: 2024-04-30

## 2024-04-30 LAB
ABO GROUP (TYPE) IN BLOOD: NORMAL
ANTIBODY SCREEN: NORMAL
CD3+CD4+ CELLS # BLD: 0.86 X10E9/L
CD3+CD4+ CELLS # BLD: 857 /MM3
CD3+CD4+ CELLS NFR BLD: 41 %
CD3+CD4+ CELLS/CD3+CD8+ CLL BLD: 0.87 %
CD3+CD8+ CELLS # BLD: 0.98 X10E9/L
CD3+CD8+ CELLS NFR BLD: 47 %
HIV1 RNA # PLAS NAA DL=20: NOT DETECTED {COPIES}/ML
HIV1 RNA SPEC NAA+PROBE-LOG#: NORMAL {LOG_COPIES}/ML
LYMPHOCYTES # SPEC AUTO: 2.09 X10*3/UL
RH FACTOR (ANTIGEN D): NORMAL
TREPONEMA PALLIDUM IGG+IGM AB [PRESENCE] IN SERUM OR PLASMA BY IMMUNOASSAY: NONREACTIVE

## 2024-05-09 ENCOUNTER — APPOINTMENT (OUTPATIENT)
Dept: OBSTETRICS AND GYNECOLOGY | Facility: CLINIC | Age: 29
End: 2024-05-09
Payer: COMMERCIAL

## 2024-05-13 ENCOUNTER — ROUTINE PRENATAL (OUTPATIENT)
Dept: MATERNAL FETAL MEDICINE | Facility: CLINIC | Age: 29
End: 2024-05-13
Payer: COMMERCIAL

## 2024-05-13 VITALS — DIASTOLIC BLOOD PRESSURE: 61 MMHG | BODY MASS INDEX: 29.45 KG/M2 | WEIGHT: 177 LBS | SYSTOLIC BLOOD PRESSURE: 98 MMHG

## 2024-05-13 DIAGNOSIS — O09.93 SUPERVISION OF HIGH RISK PREGNANCY IN THIRD TRIMESTER (HHS-HCC): ICD-10-CM

## 2024-05-13 DIAGNOSIS — Z3A.30 30 WEEKS GESTATION OF PREGNANCY (HHS-HCC): Primary | ICD-10-CM

## 2024-05-13 DIAGNOSIS — O98.719 HIV INFECTION IN MOTHER DURING PREGNANCY, ANTEPARTUM (HHS-HCC): ICD-10-CM

## 2024-05-13 DIAGNOSIS — O99.013 ANEMIA DURING PREGNANCY IN THIRD TRIMESTER (HHS-HCC): ICD-10-CM

## 2024-05-13 PROCEDURE — 99214 OFFICE O/P EST MOD 30 MIN: CPT | Performed by: STUDENT IN AN ORGANIZED HEALTH CARE EDUCATION/TRAINING PROGRAM

## 2024-05-13 PROCEDURE — 99214 OFFICE O/P EST MOD 30 MIN: CPT | Mod: GC | Performed by: STUDENT IN AN ORGANIZED HEALTH CARE EDUCATION/TRAINING PROGRAM

## 2024-05-13 ASSESSMENT — ENCOUNTER SYMPTOMS
APPETITE CHANGE: 0
SORE THROAT: 0
FEVER: 0
BACK PAIN: 1
COUGH: 0
RHINORRHEA: 0
FLANK PAIN: 0
ABDOMINAL PAIN: 0
DIARRHEA: 0
CONSTITUTIONAL NEGATIVE: 1
SHORTNESS OF BREATH: 0
ACTIVITY CHANGE: 0
NAUSEA: 0
CHILLS: 0

## 2024-05-13 NOTE — ASSESSMENT & PLAN NOTE
She reports continued compliance with daily Biktarvy   Viral load remains undetectable, CD4 count 857 on 28-week testing  Repeat testing planned at 34-36 weeks  Delivery at [facility] discussed and appropriate team members notified

## 2024-05-13 NOTE — ASSESSMENT & PLAN NOTE
Tdap deferred  Routine prenatal testing up to date  Fetal growth assessment scheduled for 5/17/2024  Patient to establish care with Dr. Borges. Appointment scheduled for Wednesday, 5/15 at 14:30.

## 2024-05-13 NOTE — PROGRESS NOTES
Maternal-Fetal Medicine Consultation Note  Initial Consultation    Subjective   Patient ID [id number]   Saba Salamanca is a 29 y.o. year-old  at 30w1d by 12-week US who presents for follow-up M consultation. She follows with [facility] for the following high-risk issues    1) HIV infection in pregnancy  See full history as stated in initial consultation.  She reports continued daily compliance with Biktarvy. She states that her  is also compliant with his daily dosing.     Today patient presents with complaint of pelvic pressure. She denies contractions. She also denies vaginal bleeding, pelvic pain or cramping, dysuria, hematuria, fevers, chills.    Patient has requested co-management of prenatal care in order to facilitate possible delivery with prenatal provider. Amenable to follow up with Dr. Wilner Borges for routine prenatal visits.     Past medical history: HIV+, Anxiety  Surgical history: Liposuction, Breast augmentation  Obstetric history:  OB History          6    Para   2    Term   2            AB   3    Living   2         SAB   3    IAB        Ectopic        Multiple        Live Births   2                  GYN history: Last , updated Pap needed.  Social history:   Social History     Tobacco Use    Smoking status: Never    Smokeless tobacco: Never      Family history: Non-contributory    Review of Systems   Constitutional: Negative.  Negative for activity change, appetite change, chills and fever.   HENT:  Negative for congestion, rhinorrhea and sore throat.    Respiratory:  Negative for cough and shortness of breath.    Cardiovascular:  Negative for chest pain.   Gastrointestinal:  Negative for abdominal pain, diarrhea and nausea.   Genitourinary:  Negative for flank pain, vaginal bleeding and vaginal discharge.   Musculoskeletal:  Positive for back pain.        Lower back discomfort   All other systems reviewed and are negative.     Objective   Vitals:    24 1500    BP: 98/61       Physical Exam  Constitutional:       General: She is not in acute distress.     Appearance: Normal appearance. She is not ill-appearing or toxic-appearing.   HENT:      Head: Normocephalic.   Cardiovascular:      Rate and Rhythm: Normal rate and regular rhythm.   Pulmonary:      Effort: Pulmonary effort is normal. No respiratory distress.      Breath sounds: No stridor.   Abdominal:      General: Abdomen is flat. There is no distension.      Palpations: Abdomen is soft.      Tenderness: There is no abdominal tenderness. There is no guarding or rebound.   Genitourinary:     Comments: Deferred  Neurological:      General: No focal deficit present.      Mental Status: She is alert and oriented to person, place, and time.   Psychiatric:         Behavior: Behavior normal.         Thought Content: Thought content normal.         Judgment: Judgment normal.      bpm.      Latest Reference Range & Units 24 16:13   CD3+CD4+ Absolute 0.350 - 2.740 x10E9/L 0.857     HIV RNA Result  Not Detected Not Detected         Assessment/Plan   Saba Salamanca is a 29 y.o.  at 30w1d who presents for follow-up MFM consultation.    HIV infection in mother during pregnancy, antepartum ([facility])  She reports continued compliance with daily Biktarvy   Viral load remains undetectable, CD4 count 857 on 28-week testing  Repeat testing planned at 34-36 weeks  Delivery at [facility] discussed and appropriate team members notified    Supervision of high risk pregnancy in third trimester ([facility])  Tdap deferred  Routine prenatal testing up to date  Fetal growth assessment scheduled for 2024  Patient to establish care with Dr. Borges. Appointment scheduled for Wednesday, 5/15 at 14:30.     Thank you for allowing us to participate in the care of your patient. We recommend continued co-management with follow-up visit with MFM in 4 weeks (concurrent with growth ultrasound and for delivery planning).   Do not hesitate  to reach out with additional concerns and/or questions that may arise.    Patient seen and discussed with Dr. De.    Buffy Bosch MD  Fellow, Maternal-Fetal Medicine

## 2024-05-13 NOTE — Clinical Note
Hi Dr. Borges,  This patient was previously being followed by Dr. Hollingsworth who requested that she transfer care in the third trimester. She has been following with us given history of HIV+ status (CD4 count normal, compliant with ART, VL UD for last 2 assessments).  Saba would really like to deliver at [facility] and requests to establish care with a provider that would potentially be delivering her at [facility]--we recommended your care. She is scheduled for a visit this Wednesday, at 2:30pm.  We have also discussed her delivery plan with the pharmacy and peds providers at [facility] who are OK with the plan for delivery at [facility]. Thanks! Buffy

## 2024-05-15 ENCOUNTER — ROUTINE PRENATAL (OUTPATIENT)
Dept: OBSTETRICS AND GYNECOLOGY | Facility: CLINIC | Age: 29
End: 2024-05-15
Payer: COMMERCIAL

## 2024-05-15 VITALS — DIASTOLIC BLOOD PRESSURE: 66 MMHG | BODY MASS INDEX: 29.15 KG/M2 | WEIGHT: 175.2 LBS | SYSTOLIC BLOOD PRESSURE: 99 MMHG

## 2024-05-15 DIAGNOSIS — Z78.9 HEPATITIS B IMMUNE: ICD-10-CM

## 2024-05-15 DIAGNOSIS — Z3A.30 30 WEEKS GESTATION OF PREGNANCY (HHS-HCC): Primary | ICD-10-CM

## 2024-05-15 DIAGNOSIS — O99.013 ANEMIA DURING PREGNANCY IN THIRD TRIMESTER (HHS-HCC): ICD-10-CM

## 2024-05-15 DIAGNOSIS — O09.93 SUPERVISION OF HIGH RISK PREGNANCY IN THIRD TRIMESTER (HHS-HCC): ICD-10-CM

## 2024-05-15 DIAGNOSIS — O26.843 FUNDAL HEIGHT LOW FOR DATES IN THIRD TRIMESTER (HHS-HCC): ICD-10-CM

## 2024-05-15 DIAGNOSIS — F41.0 PANIC ANXIETY SYNDROME: ICD-10-CM

## 2024-05-15 DIAGNOSIS — O98.713: ICD-10-CM

## 2024-05-15 LAB
POC BLOOD, URINE: NEGATIVE
POC GLUCOSE, URINE: NEGATIVE MG/DL
POC KETONES, URINE: ABNORMAL MG/DL
POC LEUKOCYTES, URINE: NEGATIVE
POC NITRITE,URINE: NEGATIVE
POC PROTEIN, URINE: ABNORMAL MG/DL

## 2024-05-15 PROCEDURE — 0501F PRENATAL FLOW SHEET: CPT | Performed by: STUDENT IN AN ORGANIZED HEALTH CARE EDUCATION/TRAINING PROGRAM

## 2024-05-15 RX ORDER — FERROUS SULFATE 325(65) MG
325 TABLET, DELAYED RELEASE (ENTERIC COATED) ORAL
Qty: 30 TABLET | Refills: 11 | Status: SHIPPED | OUTPATIENT
Start: 2024-05-15 | End: 2024-05-15

## 2024-05-15 RX ORDER — FERROUS SULFATE 325(65) MG
325 TABLET, DELAYED RELEASE (ENTERIC COATED) ORAL
Qty: 90 TABLET | Refills: 3 | Status: SHIPPED | OUTPATIENT
Start: 2024-05-15 | End: 2025-05-15

## 2024-05-15 RX ORDER — FERROUS SULFATE 325(65) MG
325 TABLET, DELAYED RELEASE (ENTERIC COATED) ORAL
Qty: 30 TABLET | Refills: 11 | Status: SHIPPED | OUTPATIENT
Start: 2024-05-15 | End: 2024-05-15 | Stop reason: SDUPTHER

## 2024-05-15 ASSESSMENT — ENCOUNTER SYMPTOMS
OCCASIONAL FEELINGS OF UNSTEADINESS: 1
CARDIOVASCULAR NEGATIVE: 0
GASTROINTESTINAL NEGATIVE: 0
EYES NEGATIVE: 0
RESPIRATORY NEGATIVE: 0
HEMATOLOGIC/LYMPHATIC NEGATIVE: 0
ENDOCRINE NEGATIVE: 0
MUSCULOSKELETAL NEGATIVE: 0
CONSTITUTIONAL NEGATIVE: 0
LOSS OF SENSATION IN FEET: 1
DEPRESSION: 0
ALLERGIC/IMMUNOLOGIC NEGATIVE: 0
NEUROLOGICAL NEGATIVE: 0
PSYCHIATRIC NEGATIVE: 0

## 2024-05-15 ASSESSMENT — COLUMBIA-SUICIDE SEVERITY RATING SCALE - C-SSRS
2. HAVE YOU ACTUALLY HAD ANY THOUGHTS OF KILLING YOURSELF?: NO
6. HAVE YOU EVER DONE ANYTHING, STARTED TO DO ANYTHING, OR PREPARED TO DO ANYTHING TO END YOUR LIFE?: NO
1. IN THE PAST MONTH, HAVE YOU WISHED YOU WERE DEAD OR WISHED YOU COULD GO TO SLEEP AND NOT WAKE UP?: NO

## 2024-05-15 ASSESSMENT — PATIENT HEALTH QUESTIONNAIRE - PHQ9
SUM OF ALL RESPONSES TO PHQ9 QUESTIONS 1 AND 2: 0
2. FEELING DOWN, DEPRESSED OR HOPELESS: NOT AT ALL
1. LITTLE INTEREST OR PLEASURE IN DOING THINGS: NOT AT ALL

## 2024-05-15 NOTE — LETTER
May 15, 2024     Patient: Saba Salamanca   YOB: 1995   Date of Visit: 5/15/2024       To Whom It May Concern:    It is my medical opinion that Ms. Salamanca requires a disability parking placard for the following reasons:  She has limited walking ability due to an orthopedic condition.  Duration of need: 5 months    If you have any questions or concerns, please don't hesitate to call.         Sincerely,        Wilner Borges MD        CC: No Recipients

## 2024-05-15 NOTE — PROGRESS NOTES
"Subjective   Patient ID [id number]   Saba Salamanca is a 29 y.o.  at 30w3d with a working estimated date of delivery of 2024, by Ultrasound who presents for a routine prenatal visit. She denies vaginal bleeding, leakage of fluid, decreased fetal movements, or contractions.    Her pregnancy is complicated by:  HIV+  Anemia  Size less than dates  Desires bromocriptine PP for lactation  suppression  Anxiety    Objective   Physical Exam:   Weight: 79.5 kg (175 lb 3.2 oz)  Expected Total Weight Gain: 7 kg (15 lb)-11.5 kg (25 lb)   Pregravid BMI: 28.29  BP: 99/66  Fetal Heart Rate: 145 Fundal Height (cm): 26 cm             Prenatal Labs  Urine Dip:  Lab Results   Component Value Date    KETONESU 40 (2+) (A) 05/15/2024     Lab Results   Component Value Date    HGB 10.0 (L) 2024    HCT 31.2 (L) 2024    ABO O 2024    HEPBSAG Nonreactive 2023     No results found for: \"PAPPA\", \"AFP\", \"HCG\", \"ESTRIOL\", \"INHBA\"  No results found for: \"GLUF\", \"GLUT1\", \"USUNODF7JQ\", \"[id number]\"    Imaging  The most recent ultrasound was performed on The most recent ultrasound study is not finalized with a study GA of The most recent ultrasound study is not finalized and EFW of The most recent ultrasound study is not finalized.  The most recent ultrasound study is not finalized  The most recent ultrasound study is not finalized    Assessment/Plan   Diagnoses and all orders for this visit:  30 weeks gestation of pregnancy ([facility])  -     POCT UA Automated manually resulted  Maternal infection with human immunodeficiency virus in third trimester ([facility])  Anemia during pregnancy in third trimester ([facility])  -     CBC Anemia Panel With Reflex,Pregnancy; Future  -     ferrous sulfate 325 (65 Fe) MG EC tablet; Take 1 tablet by mouth once daily with breakfast. Do not crush, chew, or split.  Hepatitis B immune  Panic anxiety syndrome  Supervision of high risk pregnancy in third trimester ([facility])  Fundal height low " for dates in third trimester ([facility])    29-year-old G6, P2 here for 30-week visit.  Patient is here for comanagement with MFM due to HIV positive status with undetectable viral load.  Patient to follow-up in 2 weeks.  Fundal height low for dates so but growth scan is scheduled later this week.  Patient to obtain follow-up CBC prior to next visit and to start taking oral iron which will be mailed to her pharmacy.    Continue prenatal vitamin.  Labs reviewed.    Follow up in 2 week for a routine prenatal visit.

## 2024-05-17 ENCOUNTER — HOSPITAL ENCOUNTER (OUTPATIENT)
Dept: RADIOLOGY | Facility: CLINIC | Age: 29
Discharge: HOME | End: 2024-05-17
Payer: COMMERCIAL

## 2024-05-17 ENCOUNTER — APPOINTMENT (OUTPATIENT)
Dept: MATERNAL FETAL MEDICINE | Facility: CLINIC | Age: 29
End: 2024-05-17
Payer: COMMERCIAL

## 2024-05-17 DIAGNOSIS — Z32.01 PREGNANCY TEST POSITIVE (HHS-HCC): ICD-10-CM

## 2024-05-17 PROCEDURE — 76819 FETAL BIOPHYS PROFIL W/O NST: CPT

## 2024-05-17 PROCEDURE — 76819 FETAL BIOPHYS PROFIL W/O NST: CPT | Performed by: STUDENT IN AN ORGANIZED HEALTH CARE EDUCATION/TRAINING PROGRAM

## 2024-05-17 PROCEDURE — 76816 OB US FOLLOW-UP PER FETUS: CPT

## 2024-05-17 PROCEDURE — 76816 OB US FOLLOW-UP PER FETUS: CPT | Performed by: STUDENT IN AN ORGANIZED HEALTH CARE EDUCATION/TRAINING PROGRAM

## 2024-05-17 PROCEDURE — 76820 UMBILICAL ARTERY ECHO: CPT | Performed by: STUDENT IN AN ORGANIZED HEALTH CARE EDUCATION/TRAINING PROGRAM

## 2024-05-25 ENCOUNTER — HOSPITAL ENCOUNTER (OUTPATIENT)
Facility: HOSPITAL | Age: 29
Setting detail: OBSERVATION
End: 2024-05-25
Attending: OBSTETRICS & GYNECOLOGY | Admitting: OBSTETRICS & GYNECOLOGY
Payer: COMMERCIAL

## 2024-05-25 ENCOUNTER — HOSPITAL ENCOUNTER (OUTPATIENT)
Facility: HOSPITAL | Age: 29
Discharge: HOME | End: 2024-05-25
Attending: OBSTETRICS & GYNECOLOGY | Admitting: OBSTETRICS & GYNECOLOGY
Payer: COMMERCIAL

## 2024-05-25 VITALS
OXYGEN SATURATION: 98 % | WEIGHT: 176.37 LBS | SYSTOLIC BLOOD PRESSURE: 96 MMHG | HEART RATE: 91 BPM | DIASTOLIC BLOOD PRESSURE: 62 MMHG | TEMPERATURE: 97.3 F | BODY MASS INDEX: 29.35 KG/M2 | RESPIRATION RATE: 20 BRPM

## 2024-05-25 LAB
ALBUMIN SERPL BCP-MCNC: 3.4 G/DL (ref 3.4–5)
ALP SERPL-CCNC: 81 U/L (ref 33–110)
ALT SERPL W P-5'-P-CCNC: 21 U/L (ref 7–45)
ANION GAP SERPL CALC-SCNC: 12 MMOL/L (ref 10–20)
AST SERPL W P-5'-P-CCNC: 25 U/L (ref 9–39)
BASOPHILS # BLD AUTO: 0.03 X10*3/UL (ref 0–0.1)
BASOPHILS NFR BLD AUTO: 0.3 %
BILIRUB SERPL-MCNC: 0.3 MG/DL (ref 0–1.2)
BUN SERPL-MCNC: 6 MG/DL (ref 6–23)
CALCIUM SERPL-MCNC: 8.7 MG/DL (ref 8.6–10.3)
CHLORIDE SERPL-SCNC: 104 MMOL/L (ref 98–107)
CO2 SERPL-SCNC: 22 MMOL/L (ref 21–32)
CREAT SERPL-MCNC: 0.59 MG/DL (ref 0.5–1.05)
EGFRCR SERPLBLD CKD-EPI 2021: >90 ML/MIN/1.73M*2
EOSINOPHIL # BLD AUTO: 0.13 X10*3/UL (ref 0–0.7)
EOSINOPHIL NFR BLD AUTO: 1.3 %
ERYTHROCYTE [DISTWIDTH] IN BLOOD BY AUTOMATED COUNT: 14.4 % (ref 11.5–14.5)
GLUCOSE SERPL-MCNC: 67 MG/DL (ref 74–99)
HCT VFR BLD AUTO: 32.9 % (ref 36–46)
HGB BLD-MCNC: 10.7 G/DL (ref 12–16)
IMM GRANULOCYTES # BLD AUTO: 0.07 X10*3/UL (ref 0–0.7)
IMM GRANULOCYTES NFR BLD AUTO: 0.7 % (ref 0–0.9)
LYMPHOCYTES # BLD AUTO: 2.15 X10*3/UL (ref 1.2–4.8)
LYMPHOCYTES NFR BLD AUTO: 21.8 %
MCH RBC QN AUTO: 24 PG (ref 26–34)
MCHC RBC AUTO-ENTMCNC: 32.5 G/DL (ref 32–36)
MCV RBC AUTO: 74 FL (ref 80–100)
MONOCYTES # BLD AUTO: 0.81 X10*3/UL (ref 0.1–1)
MONOCYTES NFR BLD AUTO: 8.2 %
NEUTROPHILS # BLD AUTO: 6.67 X10*3/UL (ref 1.2–7.7)
NEUTROPHILS NFR BLD AUTO: 67.7 %
NRBC BLD-RTO: 0 /100 WBCS (ref 0–0)
PLATELET # BLD AUTO: 277 X10*3/UL (ref 150–450)
POTASSIUM SERPL-SCNC: 4 MMOL/L (ref 3.5–5.3)
PROT SERPL-MCNC: 6.5 G/DL (ref 6.4–8.2)
RBC # BLD AUTO: 4.45 X10*6/UL (ref 4–5.2)
SODIUM SERPL-SCNC: 134 MMOL/L (ref 136–145)
WBC # BLD AUTO: 9.9 X10*3/UL (ref 4.4–11.3)

## 2024-05-25 PROCEDURE — 99214 OFFICE O/P EST MOD 30 MIN: CPT | Performed by: OBSTETRICS & GYNECOLOGY

## 2024-05-25 PROCEDURE — G0378 HOSPITAL OBSERVATION PER HR: HCPCS

## 2024-05-25 PROCEDURE — 80053 COMPREHEN METABOLIC PANEL: CPT | Performed by: OBSTETRICS & GYNECOLOGY

## 2024-05-25 PROCEDURE — 85025 COMPLETE CBC W/AUTO DIFF WBC: CPT | Performed by: OBSTETRICS & GYNECOLOGY

## 2024-05-25 PROCEDURE — 36415 COLL VENOUS BLD VENIPUNCTURE: CPT | Performed by: OBSTETRICS & GYNECOLOGY

## 2024-05-25 RX ORDER — ONDANSETRON 4 MG/1
4 TABLET, FILM COATED ORAL EVERY 6 HOURS PRN
Status: DISCONTINUED | OUTPATIENT
Start: 2024-05-25 | End: 2024-05-25 | Stop reason: HOSPADM

## 2024-05-25 RX ORDER — ONDANSETRON HYDROCHLORIDE 2 MG/ML
4 INJECTION, SOLUTION INTRAVENOUS EVERY 6 HOURS PRN
Status: DISCONTINUED | OUTPATIENT
Start: 2024-05-25 | End: 2024-05-25 | Stop reason: HOSPADM

## 2024-05-25 RX ORDER — LIDOCAINE HYDROCHLORIDE 10 MG/ML
0.5 INJECTION INFILTRATION; PERINEURAL ONCE AS NEEDED
Status: DISCONTINUED | OUTPATIENT
Start: 2024-05-25 | End: 2024-05-25 | Stop reason: HOSPADM

## 2024-05-25 RX ORDER — LABETALOL HYDROCHLORIDE 5 MG/ML
20 INJECTION, SOLUTION INTRAVENOUS ONCE AS NEEDED
Status: DISCONTINUED | OUTPATIENT
Start: 2024-05-25 | End: 2024-05-25 | Stop reason: HOSPADM

## 2024-05-25 RX ORDER — NIFEDIPINE 10 MG/1
10 CAPSULE ORAL ONCE AS NEEDED
Status: DISCONTINUED | OUTPATIENT
Start: 2024-05-25 | End: 2024-05-25 | Stop reason: HOSPADM

## 2024-05-25 RX ORDER — HYDRALAZINE HYDROCHLORIDE 20 MG/ML
5 INJECTION INTRAMUSCULAR; INTRAVENOUS ONCE AS NEEDED
Status: DISCONTINUED | OUTPATIENT
Start: 2024-05-25 | End: 2024-05-25 | Stop reason: HOSPADM

## 2024-05-25 ASSESSMENT — PAIN SCALES - GENERAL: PAINLEVEL_OUTOF10: 4

## 2024-05-25 NOTE — H&P
Obstetrical Admission History and Physical     Patient with complaints of abdominal pain and cramping decreased fetal movement.  Nausea no emesis.  Mucousy vaginal discharge.    Patient feels improved with much less cramping after hydration.  Labs reviewed and normal.  Urine consistent with mild dehydration but no infection.    Discharged home to follow-up as scheduled   Obstetrical History   OB History    Para Term  AB Living   6 2 2   3 2   SAB IAB Ectopic Multiple Live Births   3       2      # Outcome Date GA Lbr Dharmesh/2nd Weight Sex Delivery Anes PTL Lv   6 Current            5 Term 19 40w0d  2.863 kg F Vag-Spont EPI N NOEL   4 SAB 18 6w0d             Birth Comments: D&C   3 Term 16 38w0d  2.948 kg M Vag-Spont EPI N NOEL      Complications: Low maternal weight gain ([facility])   2 SAB            1 SAB                Past Medical History  Past Medical History:   Diagnosis Date    Acne 06/15/2023    Amenorrhea 10/19/2023    Double vision 10/19/2023    Dysuria 10/19/2023    Encounter for supervision of normal first pregnancy, first trimester ([facility]) 2016    Encounter for prenatal care in first trimester of first pregnancy    Encounter for supervision of normal first pregnancy, second trimester ([facility]) 2016    Encounter for prenatal care of first pregnancy, antepartum, second trimester    Encounter for supervision of normal first pregnancy, unspecified trimester ([facility]) 2016    Encounter for supervision of normal first pregnancy    Hematuria 10/19/2023    High risk sexual behavior 10/19/2023    History of spontaneous , currently pregnant ([facility]) 10/09/2018    Irregular menses 10/19/2023    Irritable bladder 10/19/2023    Leukorrhea 10/19/2023    Loss of appetite 10/19/2023    Low blood pressure 10/19/2023    Memory deficit 10/19/2023    Other fatigue 2022    Other mixed anxiety disorders 2022    Last Assessment & Plan: Formatting of this note  might be different from the original. A: secondary, chronic/complex problem with global, trauma, and panic features, Currently severe and resulting in disability/impaired fnx. P: START/RE-TRIAL FLUOXETINE 10mg HS, SSRI is first-line tx recommendation for MDD/Anxiety and PTSD; may be less sedating than alternatives and better tolerated; given clients     Personal history of other diseases of the female genital tract 2016    History of irregular menstrual cycles    Poor appetite 06/15/2023    SAB (spontaneous ) ([facility]) 10/19/2023    Seizure-like activity ([facility]) 2022    Sleep disturbance 2022    Sleep paralysis 2022    Syncope and collapse 10/19/2023    Threatened spontaneous  ([facility]) 10/19/2023    Tremor of both hands 10/19/2023    Unspecified mood (affective) disorder ([facility]) 05/10/2022    Urine leukocytes 10/19/2023    Uterine size-date discrepancy, unspecified trimester ([facility]) 2016    Fetal size inconsistent with dates    Vaginal delivery ([facility]) 10/19/2023    Vitamin D deficiency 10/19/2023        Past Surgical History   History reviewed. No pertinent surgical history.    Social History  Social History     Tobacco Use    Smoking status: Never    Smokeless tobacco: Never   Substance Use Topics    Alcohol use: Never     Substance and Sexual Activity   Drug Use Never       Allergies  Milk     Medications  Medications Prior to Admission   Medication Sig Dispense Refill Last Dose    benzoyl peroxide 10 % bar Use to wash affected areas on body daily       benzoyl peroxide 5 % external wash Apply 1 application. topically 2 times a day.       Biktarvy -25 mg tablet Take 1 tablet by mouth once daily.       ferrous sulfate 325 (65 Fe) MG EC tablet Take 1 tablet by mouth once daily with breakfast. Do not crush, chew, or split. 90 tablet 3     prenatal vitamin, iron-folic, (prenatal vit no.130-iron-folic) 27 mg iron-800 mcg folic acid tablet Take 1 tablet by  mouth once daily. 90 tablet 0        Objective    Last Vitals  Temp Pulse Resp BP MAP O2 Sat   36.3 °C (97.3 °F) 91 20 96/62   98 %     Physical Examination  Abdomen is soft gravid nontender.  Fetal heart tones reactive and normal at 140.  1 contraction noted in over 2 hours.  Pelvic exam: External genitalia [place]tholin's urethra and [place]'s normal.  Vaginal vault without blood or discharge.  Cervix is closed long high presenting part ballotable    Lab Review  Lab Results   Component Value Date    WBC 9.9 05/25/2024    HGB 10.7 (L) 05/25/2024    HCT 32.9 (L) 05/25/2024     05/25/2024     Lab Results   Component Value Date    GLUCOSE 67 (L) 05/25/2024     (L) 05/25/2024    K 4.0 05/25/2024     05/25/2024    CO2 22 05/25/2024    ANIONGAP 12 05/25/2024    BUN 6 05/25/2024    CREATININE 0.59 05/25/2024    EGFR >90 05/25/2024    CALCIUM 8.7 05/25/2024    ALBUMIN 3.4 05/25/2024    PROT 6.5 05/25/2024    ALKPHOS 81 05/25/2024    ALT 21 05/25/2024    AST 25 05/25/2024    BILITOT 0.3 05/25/2024

## 2024-05-29 ENCOUNTER — ROUTINE PRENATAL (OUTPATIENT)
Dept: OBSTETRICS AND GYNECOLOGY | Facility: CLINIC | Age: 29
End: 2024-05-29
Payer: COMMERCIAL

## 2024-05-29 VITALS — SYSTOLIC BLOOD PRESSURE: 109 MMHG | BODY MASS INDEX: 29.72 KG/M2 | DIASTOLIC BLOOD PRESSURE: 70 MMHG | WEIGHT: 178.6 LBS

## 2024-05-29 DIAGNOSIS — F41.0 PANIC ANXIETY SYNDROME: ICD-10-CM

## 2024-05-29 DIAGNOSIS — L08.9 SUPERFICIAL SKIN INFECTION: ICD-10-CM

## 2024-05-29 DIAGNOSIS — Z3A.32 32 WEEKS GESTATION OF PREGNANCY (HHS-HCC): Primary | ICD-10-CM

## 2024-05-29 DIAGNOSIS — O99.013 ANEMIA DURING PREGNANCY IN THIRD TRIMESTER (HHS-HCC): ICD-10-CM

## 2024-05-29 DIAGNOSIS — O98.719 HIV INFECTION IN MOTHER DURING PREGNANCY, ANTEPARTUM (HHS-HCC): ICD-10-CM

## 2024-05-29 DIAGNOSIS — O09.93 SUPERVISION OF HIGH RISK PREGNANCY IN THIRD TRIMESTER (HHS-HCC): ICD-10-CM

## 2024-05-29 PROCEDURE — 0501F PRENATAL FLOW SHEET: CPT | Performed by: STUDENT IN AN ORGANIZED HEALTH CARE EDUCATION/TRAINING PROGRAM

## 2024-05-29 ASSESSMENT — ENCOUNTER SYMPTOMS
MUSCULOSKELETAL NEGATIVE: 0
HEMATOLOGIC/LYMPHATIC NEGATIVE: 0
ALLERGIC/IMMUNOLOGIC NEGATIVE: 0
RESPIRATORY NEGATIVE: 0
PSYCHIATRIC NEGATIVE: 0
OCCASIONAL FEELINGS OF UNSTEADINESS: 0
CARDIOVASCULAR NEGATIVE: 0
EYES NEGATIVE: 0
DEPRESSION: 0
LOSS OF SENSATION IN FEET: 0
NEUROLOGICAL NEGATIVE: 0
GASTROINTESTINAL NEGATIVE: 0
CONSTITUTIONAL NEGATIVE: 0
ENDOCRINE NEGATIVE: 0

## 2024-05-29 NOTE — PROGRESS NOTES
"Subjective   Patient ID [id number]   Saba Salamanca is a 29 y.o.  at 32w3d with a working estimated date of delivery of 2024, by Ultrasound who presents for a routine prenatal visit. She denies vaginal bleeding, leakage of fluid, decreased fetal movements, or contractions.    Her pregnancy is complicated by:  HIV+  Anemia  Size less than dates  Desires bromocriptine PP for lactation  suppression  Anxiety    Objective   Physical Exam:   Weight: 81 kg (178 lb 9.6 oz)  Expected Total Weight Gain: 7 kg (15 lb)-11.5 kg (25 lb)   Pregravid BMI: 28.29  BP: 109/70  Fetal Heart Rate: 145 Fundal Height (cm): 30 cm             Prenatal Labs  Urine Dip:  Lab Results   Component Value Date    KETONESU 40 (2+) (A) 05/15/2024     Lab Results   Component Value Date    HGB 10.7 (L) 2024    HCT 32.9 (L) 2024    ABO O 2024    HEPBSAG Nonreactive 2023     No results found for: \"PAPPA\", \"AFP\", \"HCG\", \"ESTRIOL\", \"INHBA\"  No results found for: \"GLUF\", \"GLUT1\", \"KWUBVCJ9JI\", \"[id number]\"    Imaging  The most recent ultrasound was performed on The most recent ultrasound study is not finalized with a study GA of The most recent ultrasound study is not finalized and EFW of The most recent ultrasound study is not finalized.  The most recent ultrasound study is not finalized  The most recent ultrasound study is not finalized    Assessment/Plan   Diagnoses and all orders for this visit:  32 weeks gestation of pregnancy ([facility])  Anemia during pregnancy in third trimester ([facility])  HIV infection in mother during pregnancy, antepartum ([facility])  Panic anxiety syndrome  Supervision of high risk pregnancy in third trimester ([facility])  Superficial skin infection    29-year-old  here for 32-week visit.  Pregnancy is complicated by HIV positive status on Biktarvy with undetectable viral load.  Viral load last checked on .  Patient is encouraged to increase dietary iron given maternal anemia.  Patient also " complaining of cyst between buttocks which on exam appears to be a resolving clogged pore.  No evidence of active infection.  Patient to follow-up in 2 weeks    Continue prenatal vitamin.  Labs reviewed.

## 2024-06-10 ENCOUNTER — ROUTINE PRENATAL (OUTPATIENT)
Dept: MATERNAL FETAL MEDICINE | Facility: CLINIC | Age: 29
End: 2024-06-10
Payer: COMMERCIAL

## 2024-06-10 ENCOUNTER — HOSPITAL ENCOUNTER (OUTPATIENT)
Dept: RADIOLOGY | Facility: CLINIC | Age: 29
Discharge: HOME | End: 2024-06-10
Payer: COMMERCIAL

## 2024-06-10 VITALS — WEIGHT: 177.6 LBS | DIASTOLIC BLOOD PRESSURE: 66 MMHG | BODY MASS INDEX: 29.55 KG/M2 | SYSTOLIC BLOOD PRESSURE: 97 MMHG

## 2024-06-10 DIAGNOSIS — O98.719 HIV INFECTION IN MOTHER DURING PREGNANCY, ANTEPARTUM (HHS-HCC): ICD-10-CM

## 2024-06-10 DIAGNOSIS — Z21: ICD-10-CM

## 2024-06-10 DIAGNOSIS — O98.711: ICD-10-CM

## 2024-06-10 DIAGNOSIS — O36.5990 FETAL GROWTH RESTRICTION ANTEPARTUM (HHS-HCC): ICD-10-CM

## 2024-06-10 DIAGNOSIS — O09.93 SUPERVISION OF HIGH RISK PREGNANCY IN THIRD TRIMESTER (HHS-HCC): Primary | ICD-10-CM

## 2024-06-10 DIAGNOSIS — Z32.01 PREGNANCY TEST POSITIVE (HHS-HCC): ICD-10-CM

## 2024-06-10 LAB
POC APPEARANCE, URINE: CLEAR
POC BILIRUBIN, URINE: NEGATIVE
POC BLOOD, URINE: NEGATIVE
POC COLOR, URINE: YELLOW
POC GLUCOSE, URINE: NEGATIVE MG/DL
POC KETONES, URINE: ABNORMAL MG/DL
POC LEUKOCYTES, URINE: ABNORMAL
POC NITRITE,URINE: NEGATIVE
POC PH, URINE: 7 PH
POC PROTEIN, URINE: NEGATIVE MG/DL
POC SPECIFIC GRAVITY, URINE: 1.02
POC UROBILINOGEN, URINE: 0.2 EU/DL

## 2024-06-10 PROCEDURE — 76816 OB US FOLLOW-UP PER FETUS: CPT | Performed by: MEDICAL GENETICS

## 2024-06-10 PROCEDURE — 81003 URINALYSIS AUTO W/O SCOPE: CPT | Mod: QW | Performed by: STUDENT IN AN ORGANIZED HEALTH CARE EDUCATION/TRAINING PROGRAM

## 2024-06-10 PROCEDURE — 99214 OFFICE O/P EST MOD 30 MIN: CPT | Mod: GC | Performed by: STUDENT IN AN ORGANIZED HEALTH CARE EDUCATION/TRAINING PROGRAM

## 2024-06-10 PROCEDURE — 76819 FETAL BIOPHYS PROFIL W/O NST: CPT | Performed by: MEDICAL GENETICS

## 2024-06-10 PROCEDURE — 76820 UMBILICAL ARTERY ECHO: CPT | Performed by: MEDICAL GENETICS

## 2024-06-10 PROCEDURE — 76816 OB US FOLLOW-UP PER FETUS: CPT

## 2024-06-10 PROCEDURE — 76820 UMBILICAL ARTERY ECHO: CPT

## 2024-06-10 PROCEDURE — 99214 OFFICE O/P EST MOD 30 MIN: CPT | Performed by: STUDENT IN AN ORGANIZED HEALTH CARE EDUCATION/TRAINING PROGRAM

## 2024-06-10 ASSESSMENT — ENCOUNTER SYMPTOMS
APPETITE CHANGE: 0
RHINORRHEA: 0
ABDOMINAL PAIN: 0
DIARRHEA: 0
SHORTNESS OF BREATH: 0
FLANK PAIN: 0
CHILLS: 0
SORE THROAT: 0
ACTIVITY CHANGE: 0
BACK PAIN: 1
NAUSEA: 0
FEVER: 0
CONSTITUTIONAL NEGATIVE: 1
COUGH: 0

## 2024-06-10 NOTE — PROGRESS NOTES
Maternal-Fetal Medicine Consultation Note  Follow-up Consultation    Subjective   Patient ID [id number]   Saba Salamanca is a 29 y.o. year-old  at 34w1d by 12-week US who presents for follow-up MFM consultation. She follows with [facility] for the following high-risk issues    1) HIV infection in pregnancy  See full history as stated in initial consultation.  She reports continued daily compliance with Biktarvy. She states that her  is also compliant with his daily dosing.     Patient evaluated on LD on  due to reported abdominal cramping and decreased fetal movement. Workup without evidence of  labor. Fetal movement improved after hydration.   She continues to report occasional contractions but denies loss of fluid, vaginal bleeding, abnormal vaginal discharge. She reports good fetal movement.       Past medical history: HIV+, Anxiety  Surgical history: Liposuction, Breast augmentation  Obstetric history:  OB History          6    Para   2    Term   2            AB   3    Living   2         SAB   3    IAB        Ectopic        Multiple        Live Births   2                  GYN history: Last , updated Pap needed.  Social history:   Social History     Tobacco Use    Smoking status: Never    Smokeless tobacco: Never   Vaping Use    Vaping status: Never Used   Substance Use Topics    Alcohol use: Never    Drug use: Never      Family history: Non-contributory    Review of Systems   Constitutional: Negative.  Negative for activity change, appetite change, chills and fever.   HENT:  Negative for congestion, rhinorrhea and sore throat.    Respiratory:  Negative for cough and shortness of breath.    Cardiovascular:  Negative for chest pain.   Gastrointestinal:  Negative for abdominal pain, diarrhea and nausea.   Genitourinary:  Negative for flank pain, vaginal bleeding and vaginal discharge.   Musculoskeletal:  Positive for back pain.        Lower back discomfort   All other systems  reviewed and are negative.     Objective   Vitals:    06/10/24 1300   BP: 97/66         Physical Exam  Constitutional:       General: She is not in acute distress.     Appearance: Normal appearance. She is not ill-appearing or toxic-appearing.   HENT:      Head: Normocephalic.   Cardiovascular:      Rate and Rhythm: Normal rate and regular rhythm.   Pulmonary:      Effort: Pulmonary effort is normal. No respiratory distress.      Breath sounds: No stridor.   Abdominal:      General: Abdomen is flat. There is no distension.      Palpations: Abdomen is soft.      Tenderness: There is no abdominal tenderness. There is no guarding or rebound.   Genitourinary:     Comments: Deferred  Neurological:      General: No focal deficit present.      Mental Status: She is alert and oriented to person, place, and time.   Psychiatric:         Behavior: Behavior normal.         Thought Content: Thought content normal.         Judgment: Judgment normal.     US (6/10/2024):  bpm. EFW 2087g, 16%, AC 9%. Doppler indices normal. AFV normal. See full report for additional details.      Latest Reference Range & Units 24 16:13   CD3+CD4+ Absolute 0.350 - 2.740 x10E9/L 0.857      Latest Reference Range & Units 24 16:13   CD3+CD8+ Absolute 0.080 - 1.490 x10E9/L 0.982      Latest Reference Range & Units 24 16:13   HIV RNA Result Not Detected  Not Detected     Assessment/Plan   Saba Salamanca is a 29 y.o.  at 34w1d who presents for follow-up [facility] consultation.    HIV infection in mother during pregnancy, antepartum ([facility])  She reports continued compliance with daily Biktarvy   Viral load remains undetectable, CD4 count 857 on 28-week testing  Repeat (final) testing ordered today  Delivery at [facility] discussed and appropriate team members notified    Fetal growth restriction  EFW at 16th percentile for GA though AC now measuring at the 9th percentile.   Counseled on findings of FGR on ultrasound. Briefly reviewed  maternal, fetal and placental etiologies and constitutionally small. Has had normal level 2 and aneuploidy screening via cfDNA. Reviewed that in the absence of ultrasound findings suggestive of infection, malformation or genetic condition the likelihood of these as an etiology is low though cannot be entirely excluded. Findings today are not suggestive of uteroplacental insufficiency at this time. I did discuss that with fetal growth restriction there is increased risk of fetal and  morbidity and  delivery in setting of non-reassuring testing and that weekly fetal assessment is recommended.   Recommend delivery at 39 weeks as overall growth trajectory and overall fetal weight consistent in line with previous measurements; however, delivery may be indicated earlier pending fetal status. Should earlier delivery be indicated, Dr. Borges to be notified by ultrasound reading MD.     Thank you for allowing us to participate in the care of your patient.   Recommend weekly ultrasound for BPP and Dopplers with growth in 3 weeks. Plan for delivery at 39 weeks (see above). Continue routine prenatal follow up per Dr. Borges' recommendations.   Do not hesitate to reach out with additional concerns and/or questions that may arise.    Patient seen and discussed with Dr. De.    Buffy Bosch MD  Fellow, Maternal-Fetal Medicine

## 2024-06-10 NOTE — ASSESSMENT & PLAN NOTE
She reports continued compliance with daily Biktarvy   Viral load remains undetectable, CD4 count 857 on 28-week testing  Repeat (final) testing ordered today  Delivery at [facility] discussed and appropriate team members notified

## 2024-06-11 ENCOUNTER — ROUTINE PRENATAL (OUTPATIENT)
Dept: OBSTETRICS AND GYNECOLOGY | Facility: CLINIC | Age: 29
End: 2024-06-11
Payer: COMMERCIAL

## 2024-06-11 VITALS — WEIGHT: 177 LBS | DIASTOLIC BLOOD PRESSURE: 64 MMHG | BODY MASS INDEX: 29.45 KG/M2 | SYSTOLIC BLOOD PRESSURE: 99 MMHG

## 2024-06-11 DIAGNOSIS — O98.719 HIV INFECTION IN MOTHER DURING PREGNANCY, ANTEPARTUM (HHS-HCC): ICD-10-CM

## 2024-06-11 DIAGNOSIS — O36.5930 INTRAUTERINE GROWTH RESTRICTION (IUGR) AFFECTING CARE OF MOTHER, THIRD TRIMESTER, SINGLE GESTATION (HHS-HCC): ICD-10-CM

## 2024-06-11 DIAGNOSIS — Z3A.34 34 WEEKS GESTATION OF PREGNANCY (HHS-HCC): Primary | ICD-10-CM

## 2024-06-11 LAB
POC BLOOD, URINE: NEGATIVE
POC GLUCOSE, URINE: NEGATIVE MG/DL
POC KETONES, URINE: NEGATIVE MG/DL
POC LEUKOCYTES, URINE: NEGATIVE
POC NITRITE,URINE: NEGATIVE
POC PROTEIN, URINE: NEGATIVE MG/DL

## 2024-06-11 PROCEDURE — 0501F PRENATAL FLOW SHEET: CPT | Performed by: STUDENT IN AN ORGANIZED HEALTH CARE EDUCATION/TRAINING PROGRAM

## 2024-06-11 NOTE — PROGRESS NOTES
"Subjective   Patient ID [id number]   Saba Salamanca is a 29 y.o.  at 34w2d with a working estimated date of delivery of 2024, by Ultrasound who presents for a routine prenatal visit. She denies vaginal bleeding, leakage of fluid, decreased fetal movements, or contractions.    Her pregnancy is complicated by:  HIV+  FGR (AC 9%, EFW 16%)  Anemia  Size less than dates  Desires bromocriptine PP for lactation  suppression  Anxiety    Objective   Physical Exam:   Weight: 80.3 kg (177 lb)  Expected Total Weight Gain: 7 kg (15 lb)-11.5 kg (25 lb)   Pregravid BMI: 28.29  BP: 99/64  Fetal Heart Rate: 125 Fundal Height (cm): 32 cm             Prenatal Labs  Urine Dip:  Lab Results   Component Value Date    KETONESU NEGATIVE 2024     Lab Results   Component Value Date    HGB 10.7 (L) 2024    HCT 32.9 (L) 2024    ABO O 2024    HEPBSAG Nonreactive 2023     No results found for: \"PAPPA\", \"AFP\", \"HCG\", \"ESTRIOL\", \"INHBA\"  No results found for: \"GLUF\", \"GLUT1\", \"MXSIGHP2DA\", \"[id number]\"    Imaging  The most recent ultrasound was performed on The most recent ultrasound study is not finalized with a study GA of The most recent ultrasound study is not finalized and EFW of The most recent ultrasound study is not finalized.  The most recent ultrasound study is not finalized  The most recent ultrasound study is not finalized    Assessment/Plan   Diagnoses and all orders for this visit:  34 weeks gestation of pregnancy ([facility])  -     POCT UA Automated manually resulted  HIV infection in mother during pregnancy, antepartum ([facility])  Intrauterine growth restriction (IUGR) affecting care of mother, third trimester, single gestation ([facility])    29-year-old  6 para 2 here for 34-week visit.  Patient has been comanaged with maternal-fetal medicine given comorbidities to be positive status.  Maternal-fetal vital signs are within normal limits.  Patient was recently diagnosed with fetal growth " restriction given abdominal circumference less than the 10th percentile.  Patient is in need of weekly  surveillance however there is no ultrasound availability at [facility] for next week and patient strongly prefers to not return to main campus if she can avoid it so using shared decision making made plan to have 2 nonstress test next week 1 on Monday and 1 on Thursday.  Patient has a biophysical profile and UA Dopplers scheduled for .  Patient to follow-up in 2 weeks with NSTs x 2 the week prior.    Continue prenatal vitamin.  Labs reviewed.    Follow up in 1 week for a routine prenatal visit.

## 2024-06-17 ENCOUNTER — APPOINTMENT (OUTPATIENT)
Dept: OBSTETRICS AND GYNECOLOGY | Facility: CLINIC | Age: 29
End: 2024-06-17
Payer: COMMERCIAL

## 2024-06-17 VITALS — WEIGHT: 177.8 LBS | SYSTOLIC BLOOD PRESSURE: 99 MMHG | BODY MASS INDEX: 29.59 KG/M2 | DIASTOLIC BLOOD PRESSURE: 66 MMHG

## 2024-06-17 DIAGNOSIS — Z3A.35 35 WEEKS GESTATION OF PREGNANCY (HHS-HCC): Primary | ICD-10-CM

## 2024-06-17 DIAGNOSIS — O98.719 HIV INFECTION IN MOTHER DURING PREGNANCY, ANTEPARTUM (HHS-HCC): ICD-10-CM

## 2024-06-17 DIAGNOSIS — F41.0 PANIC ANXIETY SYNDROME: ICD-10-CM

## 2024-06-17 DIAGNOSIS — O99.013 ANEMIA DURING PREGNANCY IN THIRD TRIMESTER (HHS-HCC): ICD-10-CM

## 2024-06-17 DIAGNOSIS — F33.2 SEVERE EPISODE OF RECURRENT MAJOR DEPRESSIVE DISORDER, WITHOUT PSYCHOTIC FEATURES (MULTI): ICD-10-CM

## 2024-06-17 PROCEDURE — 99213 OFFICE O/P EST LOW 20 MIN: CPT | Performed by: OBSTETRICS & GYNECOLOGY

## 2024-06-17 PROCEDURE — 59025 FETAL NON-STRESS TEST: CPT | Performed by: OBSTETRICS & GYNECOLOGY

## 2024-06-17 RX ORDER — BICTEGRAVIR SODIUM, EMTRICITABINE, AND TENOFOVIR ALAFENAMIDE FUMARATE 50; 200; 25 MG/1; MG/1; MG/1
1 TABLET ORAL DAILY
Qty: 90 TABLET | Refills: 2 | Status: SHIPPED | OUTPATIENT
Start: 2024-06-17

## 2024-06-17 ASSESSMENT — ENCOUNTER SYMPTOMS
ENDOCRINE NEGATIVE: 0
RESPIRATORY NEGATIVE: 0
MUSCULOSKELETAL NEGATIVE: 0
ALLERGIC/IMMUNOLOGIC NEGATIVE: 0
EYES NEGATIVE: 0
PSYCHIATRIC NEGATIVE: 0
NEUROLOGICAL NEGATIVE: 0
CONSTITUTIONAL NEGATIVE: 0
GASTROINTESTINAL NEGATIVE: 0
CARDIOVASCULAR NEGATIVE: 0
HEMATOLOGIC/LYMPHATIC NEGATIVE: 0

## 2024-06-17 NOTE — PROGRESS NOTES
Subjective   Patient ID [id number]   Saba Salamanca is a 29 y.o.  at 35w1d with a working estimated date of delivery of 2024, by Ultrasound who presents for a routine prenatal visit. She denies vaginal bleeding, leakage of fluid, decreased fetal movements, or contractions.    Her pregnancy is complicated by:  Positive HIV, depression, round ligament pain, anemia, anxiety    Objective   Physical Exam:   Weight: 80.6 kg (177 lb 12.8 oz)  Expected Total Weight Gain: 7 kg (15 lb)-11.5 kg (25 lb)   Pregravid BMI: 28.29  BP: 99/66  Fetal Heart Rate: 140 Fundal Height (cm): 34 cm             Prenatal Labs  Urine Dip:  Lab Results   Component Value Date    KETONESU NEGATIVE 2024     Lab Results   Component Value Date    HGB 10.7 (L) 2024    HCT 32.9 (L) 2024    ABO O 2024    HEPBSAG Nonreactive 2023     Assessment/Plan   Diagnoses and all orders for this visit:  35 weeks gestation of pregnancy ([facility])  Anemia during pregnancy in third trimester ([facility])  HIV infection in mother during pregnancy, antepartum ([facility])  -     Biktarvy -25 mg tablet; Take 1 tablet by mouth once daily.  Panic anxiety syndrome  Severe episode of recurrent major depressive disorder, without psychotic features ([facility])  Continue prenatal vitamin.  Labs reviewed.  Follow-up scheduled with HIV specialist infectious disease and maternal-fetal medicine.  Per patient.  Expected mode of delivery vaginal at 38 to 39 weeks  NST reactive  Follow up in 1 week for a routine prenatal visit.

## 2024-06-17 NOTE — PROCEDURES
Saba Brown, a  at 35w1d with an TADEO of 2024, by Ultrasound, was seen at [facility] for a nonstress test.    Non-Stress Test   Baseline Fetal Heart Rate for Non-Stress Test: 140 BPM  Variability in Waveform for Non-Stress Test: Moderate  Accelerations in Non-Stress Test: Yes  Decelerations in Non-Stress Test: None  Contractions in Non-Stress Test: Not present  Acoustic Stimulator for Non-Stress Test: No  Interpretation of Non-Stress Test   Interpretation of Non-Stress Test: Reactive

## 2024-06-20 ENCOUNTER — APPOINTMENT (OUTPATIENT)
Dept: OBSTETRICS AND GYNECOLOGY | Facility: CLINIC | Age: 29
End: 2024-06-20
Payer: COMMERCIAL

## 2024-06-20 ENCOUNTER — LAB (OUTPATIENT)
Dept: LAB | Facility: LAB | Age: 29
End: 2024-06-20
Payer: COMMERCIAL

## 2024-06-20 VITALS — WEIGHT: 180.6 LBS | DIASTOLIC BLOOD PRESSURE: 68 MMHG | BODY MASS INDEX: 30.05 KG/M2 | SYSTOLIC BLOOD PRESSURE: 103 MMHG

## 2024-06-20 DIAGNOSIS — O98.719 HIV INFECTION IN MOTHER DURING PREGNANCY, ANTEPARTUM (HHS-HCC): ICD-10-CM

## 2024-06-20 DIAGNOSIS — Z36.89 NST (NON-STRESS TEST) REACTIVE ON FETAL SURVEILLANCE (HHS-HCC): ICD-10-CM

## 2024-06-20 DIAGNOSIS — O36.5930 INTRAUTERINE GROWTH RESTRICTION (IUGR) AFFECTING CARE OF MOTHER, THIRD TRIMESTER, SINGLE GESTATION (HHS-HCC): ICD-10-CM

## 2024-06-20 DIAGNOSIS — Z3A.35 35 WEEKS GESTATION OF PREGNANCY (HHS-HCC): Primary | ICD-10-CM

## 2024-06-20 LAB
BASOPHILS # BLD AUTO: 0.03 X10*3/UL (ref 0–0.1)
BASOPHILS NFR BLD AUTO: 0.3 %
EOSINOPHIL # BLD AUTO: 0.08 X10*3/UL (ref 0–0.7)
EOSINOPHIL NFR BLD AUTO: 0.8 %
ERYTHROCYTE [DISTWIDTH] IN BLOOD BY AUTOMATED COUNT: 15 % (ref 11.5–14.5)
HCT VFR BLD AUTO: 37.2 % (ref 36–46)
HGB BLD-MCNC: 11.6 G/DL (ref 12–16)
IMM GRANULOCYTES # BLD AUTO: 0.07 X10*3/UL (ref 0–0.7)
IMM GRANULOCYTES NFR BLD AUTO: 0.7 % (ref 0–0.9)
LYMPHOCYTES # BLD AUTO: 2.14 X10*3/UL (ref 1.2–4.8)
LYMPHOCYTES NFR BLD AUTO: 21.9 %
MCH RBC QN AUTO: 24.3 PG (ref 26–34)
MCHC RBC AUTO-ENTMCNC: 31.2 G/DL (ref 32–36)
MCV RBC AUTO: 78 FL (ref 80–100)
MONOCYTES # BLD AUTO: 0.89 X10*3/UL (ref 0.1–1)
MONOCYTES NFR BLD AUTO: 9.1 %
NEUTROPHILS # BLD AUTO: 6.58 X10*3/UL (ref 1.2–7.7)
NEUTROPHILS NFR BLD AUTO: 67.2 %
NRBC BLD-RTO: 0 /100 WBCS (ref 0–0)
PLATELET # BLD AUTO: 281 X10*3/UL (ref 150–450)
RBC # BLD AUTO: 4.78 X10*6/UL (ref 4–5.2)
WBC # BLD AUTO: 9.8 X10*3/UL (ref 4.4–11.3)

## 2024-06-20 PROCEDURE — 36415 COLL VENOUS BLD VENIPUNCTURE: CPT

## 2024-06-20 PROCEDURE — 85025 COMPLETE CBC W/AUTO DIFF WBC: CPT

## 2024-06-20 PROCEDURE — 88185 FLOWCYTOMETRY/TC ADD-ON: CPT

## 2024-06-20 PROCEDURE — 87536 HIV-1 QUANT&REVRSE TRNSCRPJ: CPT

## 2024-06-20 PROCEDURE — 88184 FLOWCYTOMETRY/ TC 1 MARKER: CPT

## 2024-06-20 ASSESSMENT — ENCOUNTER SYMPTOMS
ALLERGIC/IMMUNOLOGIC NEGATIVE: 0
NEUROLOGICAL NEGATIVE: 0
HEMATOLOGIC/LYMPHATIC NEGATIVE: 0
ENDOCRINE NEGATIVE: 0
MUSCULOSKELETAL NEGATIVE: 0
GASTROINTESTINAL NEGATIVE: 0
EYES NEGATIVE: 0
CARDIOVASCULAR NEGATIVE: 0
PSYCHIATRIC NEGATIVE: 0
CONSTITUTIONAL NEGATIVE: 0
RESPIRATORY NEGATIVE: 0

## 2024-06-20 NOTE — SIGNIFICANT EVENT
06/20/24 1536   Non-Stress Test    Baseline Fetal Heart Rate for Non-Stress Test 140 BPM   Variability in Waveform for Non-Stress Test 6-25 BPM   Accelerations in Non-Stress Test Yes;greater than/equal to 15 bpm;lasting at least 15 seconds   Decelerations in Non-Stress Test None   Contractions in Non-Stress Test Not present   Interpretation of Non-Stress Test    Interpretation of Non-Stress Test Reactive

## 2024-06-20 NOTE — PROGRESS NOTES
"Subjective   Patient ID [id number]   Saba Salamanca is a 29 y.o.  at 35w4d with a working estimated date of delivery of 2024, by Ultrasound who presents for a routine prenatal visit. She denies vaginal bleeding, leakage of fluid, decreased fetal movements, or contractions.    Her pregnancy is complicated by:  FGR (AC 9%, EFW 16 %)   HIV+  Anemia  Size less than dates  Desires Cabergoline PP for lactation  suppression  Anxiety    Objective   Physical Exam:   Weight: 81.9 kg (180 lb 9.6 oz)  Expected Total Weight Gain: 7 kg (15 lb)-11.5 kg (25 lb)   Pregravid BMI: 28.29  BP: 103/68                  Prenatal Labs  Urine Dip:  Lab Results   Component Value Date    KETONESU NEGATIVE 2024     Lab Results   Component Value Date    HGB 10.7 (L) 2024    HCT 32.9 (L) 2024    ABO O 2024    HEPBSAG Nonreactive 2023     No results found for: \"PAPPA\", \"AFP\", \"HCG\", \"ESTRIOL\", \"INHBA\"  No results found for: \"GLUF\", \"GLUT1\", \"QYCTBZK6AE\", \"[id number]\"    Imaging  The most recent ultrasound was performed on The most recent ultrasound study is not finalized with a study GA of The most recent ultrasound study is not finalized and EFW of The most recent ultrasound study is not finalized.  The most recent ultrasound study is not finalized  The most recent ultrasound study is not finalized    Assessment/Plan   Diagnoses and all orders for this visit:  35 weeks gestation of pregnancy ([facility])  HIV infection in mother during pregnancy, antepartum ([facility])  Intrauterine growth restriction (IUGR) affecting care of mother, third trimester, single gestation ([facility])  NST (non-stress test) reactive on fetal surveillance ([facility])    Patient here for NST.  NST reactive with baseline of 140 moderate variability and numerous accelerations no decelerations and no contractions.    Counseled patient on fetal growth restriction and recommendation delivery between 38 weeks 0 days and 39 weeks 0 days.  Patient " is undecided on when she would like to be delivered.  Follow-up as scheduled in 1 week    Continue prenatal vitamin.  Labs reviewed.    Follow up in 1 week for a routine prenatal visit.

## 2024-06-21 LAB
CD3+CD4+ CELLS # BLD: 0.9 X10E9/L
CD3+CD4+ CELLS # BLD: 899 /MM3
CD3+CD4+ CELLS NFR BLD: 42 %
CD3+CD4+ CELLS/CD3+CD8+ CLL BLD: 0.91 %
CD3+CD8+ CELLS # BLD: 0.98 X10E9/L
CD3+CD8+ CELLS NFR BLD: 46 %
LYMPHOCYTES # SPEC AUTO: 2.14 X10*3/UL

## 2024-06-22 LAB
HIV1 RNA # PLAS NAA DL=20: NOT DETECTED {COPIES}/ML
HIV1 RNA SPEC NAA+PROBE-LOG#: NORMAL {LOG_COPIES}/ML

## 2024-06-25 ENCOUNTER — TELEPHONE (OUTPATIENT)
Dept: OBSTETRICS AND GYNECOLOGY | Facility: CLINIC | Age: 29
End: 2024-06-25

## 2024-06-25 ENCOUNTER — APPOINTMENT (OUTPATIENT)
Dept: RADIOLOGY | Facility: CLINIC | Age: 29
End: 2024-06-25
Payer: COMMERCIAL

## 2024-06-25 ENCOUNTER — HOSPITAL ENCOUNTER (OUTPATIENT)
Dept: RADIOLOGY | Facility: CLINIC | Age: 29
Discharge: HOME | End: 2024-06-25
Payer: COMMERCIAL

## 2024-06-25 ENCOUNTER — APPOINTMENT (OUTPATIENT)
Dept: OBSTETRICS AND GYNECOLOGY | Facility: CLINIC | Age: 29
End: 2024-06-25
Payer: COMMERCIAL

## 2024-06-25 VITALS — SYSTOLIC BLOOD PRESSURE: 98 MMHG | WEIGHT: 180 LBS | DIASTOLIC BLOOD PRESSURE: 67 MMHG | BODY MASS INDEX: 29.95 KG/M2

## 2024-06-25 DIAGNOSIS — N89.8 VAGINAL DISCHARGE: ICD-10-CM

## 2024-06-25 DIAGNOSIS — O26.893 PELVIC PAIN AFFECTING PREGNANCY IN THIRD TRIMESTER, ANTEPARTUM (HHS-HCC): ICD-10-CM

## 2024-06-25 DIAGNOSIS — R10.2 PELVIC PAIN AFFECTING PREGNANCY IN THIRD TRIMESTER, ANTEPARTUM (HHS-HCC): ICD-10-CM

## 2024-06-25 DIAGNOSIS — O98.719 HIV INFECTION IN MOTHER DURING PREGNANCY, ANTEPARTUM (HHS-HCC): ICD-10-CM

## 2024-06-25 DIAGNOSIS — O36.5930 INTRAUTERINE GROWTH RESTRICTION (IUGR) AFFECTING CARE OF MOTHER, THIRD TRIMESTER, SINGLE GESTATION (HHS-HCC): ICD-10-CM

## 2024-06-25 DIAGNOSIS — Z32.01 PREGNANCY TEST POSITIVE (HHS-HCC): ICD-10-CM

## 2024-06-25 DIAGNOSIS — Z3A.36 36 WEEKS GESTATION OF PREGNANCY (HHS-HCC): Primary | ICD-10-CM

## 2024-06-25 DIAGNOSIS — O36.5930 MATERNAL CARE FOR OTHER KNOWN OR SUSPECTED POOR FETAL GROWTH, THIRD TRIMESTER, NOT APPLICABLE OR UNSPECIFIED (HHS-HCC): ICD-10-CM

## 2024-06-25 PROCEDURE — 87086 URINE CULTURE/COLONY COUNT: CPT

## 2024-06-25 PROCEDURE — 90471 IMMUNIZATION ADMIN: CPT | Performed by: STUDENT IN AN ORGANIZED HEALTH CARE EDUCATION/TRAINING PROGRAM

## 2024-06-25 PROCEDURE — 76819 FETAL BIOPHYS PROFIL W/O NST: CPT

## 2024-06-25 PROCEDURE — 76820 UMBILICAL ARTERY ECHO: CPT

## 2024-06-25 PROCEDURE — 90715 TDAP VACCINE 7 YRS/> IM: CPT | Performed by: STUDENT IN AN ORGANIZED HEALTH CARE EDUCATION/TRAINING PROGRAM

## 2024-06-25 PROCEDURE — 0501F PRENATAL FLOW SHEET: CPT | Performed by: STUDENT IN AN ORGANIZED HEALTH CARE EDUCATION/TRAINING PROGRAM

## 2024-06-25 PROCEDURE — 87205 SMEAR GRAM STAIN: CPT

## 2024-06-25 PROCEDURE — 87081 CULTURE SCREEN ONLY: CPT

## 2024-06-25 PROCEDURE — 76819 FETAL BIOPHYS PROFIL W/O NST: CPT | Performed by: OBSTETRICS & GYNECOLOGY

## 2024-06-25 PROCEDURE — 76820 UMBILICAL ARTERY ECHO: CPT | Performed by: OBSTETRICS & GYNECOLOGY

## 2024-06-25 RX ORDER — METRONIDAZOLE 500 MG/1
500 TABLET ORAL 2 TIMES DAILY
Qty: 14 TABLET | Refills: 0 | Status: SHIPPED | OUTPATIENT
Start: 2024-06-25 | End: 2024-07-02

## 2024-06-25 NOTE — PROGRESS NOTES
"Subjective   Patient ID [id number]   Saba Salamanca is a 29 y.o.  at 36w2d with a working estimated date of delivery of 2024, by Ultrasound who presents for a routine prenatal visit. She denies vaginal bleeding, decreased fetal movements.     Endorses contractions and leaking fluid.    Her pregnancy is complicated by:  FGR (AC 9%, EFW 16 %)   HIV+  Anemia  Desires Cabergoline PP for lactation  suppression  Anxiety  Pelvic pain    Objective   Physical Exam:   Weight: 81.6 kg (180 lb)  Expected Total Weight Gain: 7 kg (15 lb)-11.5 kg (25 lb)   Pregravid BMI: 28.29  BP: 98/67  Fetal Heart Rate: 130               Prenatal Labs  Urine Dip:  Lab Results   Component Value Date    KETONESU 40 (2+) (A) 2024     Lab Results   Component Value Date    HGB 11.6 (L) 2024    HCT 37.2 2024    ABO O 2024    HEPBSAG Nonreactive 2023     No results found for: \"PAPPA\", \"AFP\", \"HCG\", \"ESTRIOL\", \"INHBA\"  No results found for: \"GLUF\", \"GLUT1\", \"[id number]\", \"[id number]\"    Imaging  The most recent ultrasound was performed on   with a study GA of   and EFW of  .          Assessment/Plan   Diagnoses and all orders for this visit:  36 weeks gestation of pregnancy ([facility])  -     Group B Streptococcus (GBS) Prenatal Screen, Culture  -     POCT UA Automated manually resulted  Vaginal discharge  -     metroNIDAZOLE (Flagyl) 500 mg tablet; Take 1 tablet (500 mg) by mouth 2 times a day for 7 days.  -     Vaginitis Gram Stain For Bacterial Vaginosis + Yeast  Pelvic pain affecting pregnancy in third trimester, antepartum ([facility])  -     Urine culture  HIV infection in mother during pregnancy, antepartum ([facility])  -     Labor Induction; Future  Intrauterine growth restriction (IUGR) affecting care of mother, third trimester, single gestation ([facility])  -     Labor Induction; Future  Other orders  -     Tdap vaccine, age 7 years and older    Patient here for 36-week visit patient does report significant " pelvic pain that is occasionally associated with a bawling up sensation in her abdomen.  She also reports watery vaginal discharge.  NST was reactive with no decelerations and only irregular painful contractions.  Cervix is closed on exam.   labor precautions reviewed.  Speculum exam showed gray malodorous discharge consistent with BV will treat empirically with metronidazole.  There was no pooling and nitrazine test was negative.  Low suspicion for rupture membranes at this time.    Regarding her HIV viral load most recently has been undetectable.  Patient declines  delivery and prefers induction of labor at 39 weeks 0 days.  She requested this provider be present at delivery if possible.  Tdap given today.    Regarding fetal growth restriction  surveillance is so far been without evidence of fetal compromise.    Continue prenatal vitamin.  Labs reviewed.  GBS taken.    Follow up in 1 week for a routine prenatal visit.

## 2024-06-25 NOTE — SIGNIFICANT EVENT
06/25/24 1447   Non-Stress Test    Baseline Fetal Heart Rate for Non-Stress Test 10 BPM   Variability in Waveform for Non-Stress Test 6-25 BPM   Accelerations in Non-Stress Test Yes;greater than/equal to 15 bpm;lasting at least 15 seconds   Decelerations in Non-Stress Test None   Contractions in Non-Stress Test Irregular   Interpretation of Non-Stress Test    Interpretation of Non-Stress Test Reactive

## 2024-06-26 ENCOUNTER — PATIENT MESSAGE (OUTPATIENT)
Dept: OBSTETRICS AND GYNECOLOGY | Facility: CLINIC | Age: 29
End: 2024-06-26
Payer: COMMERCIAL

## 2024-06-26 DIAGNOSIS — N76.0 BV (BACTERIAL VAGINOSIS): Primary | ICD-10-CM

## 2024-06-26 DIAGNOSIS — B96.89 BV (BACTERIAL VAGINOSIS): Primary | ICD-10-CM

## 2024-06-26 LAB
CLUE CELLS VAG LPF-#/AREA: PRESENT /[LPF]
NUGENT SCORE: 8
YEAST VAG WET PREP-#/AREA: ABNORMAL

## 2024-06-26 RX ORDER — METRONIDAZOLE 7.5 MG/G
GEL VAGINAL 2 TIMES DAILY
Qty: 70 G | Refills: 0 | Status: SHIPPED | OUTPATIENT
Start: 2024-06-26 | End: 2024-07-03

## 2024-06-26 NOTE — TELEPHONE ENCOUNTER
RN returned call and left voicemail to confirm, Patient has everything she needs  Shanelle Graf RN

## 2024-06-27 LAB — BACTERIA UR CULT: NO GROWTH

## 2024-06-28 LAB — GP B STREP GENITAL QL CULT: NORMAL

## 2024-07-01 ENCOUNTER — APPOINTMENT (OUTPATIENT)
Dept: RADIOLOGY | Facility: CLINIC | Age: 29
End: 2024-07-01
Payer: COMMERCIAL

## 2024-07-01 ENCOUNTER — HOSPITAL ENCOUNTER (OUTPATIENT)
Dept: RADIOLOGY | Facility: CLINIC | Age: 29
Discharge: HOME | End: 2024-07-01
Payer: COMMERCIAL

## 2024-07-01 DIAGNOSIS — Z32.01 PREGNANCY TEST POSITIVE (HHS-HCC): ICD-10-CM

## 2024-07-01 DIAGNOSIS — O36.5990 IUGR (INTRAUTERINE GROWTH RESTRICTION) AFFECTING CARE OF MOTHER (HHS-HCC): ICD-10-CM

## 2024-07-01 PROCEDURE — 76816 OB US FOLLOW-UP PER FETUS: CPT | Performed by: MEDICAL GENETICS

## 2024-07-01 PROCEDURE — 76820 UMBILICAL ARTERY ECHO: CPT

## 2024-07-01 PROCEDURE — 76819 FETAL BIOPHYS PROFIL W/O NST: CPT

## 2024-07-01 PROCEDURE — 76820 UMBILICAL ARTERY ECHO: CPT | Performed by: MEDICAL GENETICS

## 2024-07-01 PROCEDURE — 76819 FETAL BIOPHYS PROFIL W/O NST: CPT | Performed by: MEDICAL GENETICS

## 2024-07-01 PROCEDURE — 76816 OB US FOLLOW-UP PER FETUS: CPT

## 2024-07-02 ENCOUNTER — APPOINTMENT (OUTPATIENT)
Dept: OBSTETRICS AND GYNECOLOGY | Facility: CLINIC | Age: 29
End: 2024-07-02
Payer: COMMERCIAL

## 2024-07-08 ENCOUNTER — HOSPITAL ENCOUNTER (OUTPATIENT)
Dept: RADIOLOGY | Facility: CLINIC | Age: 29
Discharge: HOME | End: 2024-07-08
Payer: COMMERCIAL

## 2024-07-08 DIAGNOSIS — Z32.01 PREGNANCY TEST POSITIVE (HHS-HCC): ICD-10-CM

## 2024-07-08 DIAGNOSIS — O36.5990 FETAL GROWTH RESTRICTION ANTEPARTUM (HHS-HCC): ICD-10-CM

## 2024-07-08 PROCEDURE — 76818 FETAL BIOPHYS PROFILE W/NST: CPT

## 2024-07-08 PROCEDURE — 76820 UMBILICAL ARTERY ECHO: CPT

## 2024-07-12 ENCOUNTER — APPOINTMENT (OUTPATIENT)
Dept: OBSTETRICS AND GYNECOLOGY | Facility: CLINIC | Age: 29
End: 2024-07-12
Payer: COMMERCIAL

## 2024-07-12 VITALS — DIASTOLIC BLOOD PRESSURE: 72 MMHG | BODY MASS INDEX: 29.95 KG/M2 | WEIGHT: 180 LBS | SYSTOLIC BLOOD PRESSURE: 108 MMHG

## 2024-07-12 DIAGNOSIS — O36.5930 INTRAUTERINE GROWTH RESTRICTION (IUGR) AFFECTING CARE OF MOTHER, THIRD TRIMESTER, SINGLE GESTATION (HHS-HCC): ICD-10-CM

## 2024-07-12 DIAGNOSIS — Z3A.38 38 WEEKS GESTATION OF PREGNANCY (HHS-HCC): Primary | ICD-10-CM

## 2024-07-12 DIAGNOSIS — O98.719 HIV INFECTION IN MOTHER DURING PREGNANCY, ANTEPARTUM (HHS-HCC): ICD-10-CM

## 2024-07-12 NOTE — PROGRESS NOTES
"Subjective   Patient ID [id number]   Saba Salamanca is a 29 y.o.  at 38w5d with a working estimated date of delivery of 2024, by Ultrasound who presents for a routine prenatal visit. She denies vaginal bleeding, leakage of fluid, decreased fetal movements, or contractions.    Her pregnancy is complicated by:  FGR (AC 9%, EFW 16 %)   HIV+  Anemia  Desires [place] PP for lactation  suppression  Anxiety  Pelvic pain    Objective   Physical Exam:   Weight: 81.6 kg (180 lb)  Expected Total Weight Gain: 7 kg (15 lb)-11.5 kg (25 lb)   Pregravid BMI: 28.29  BP: 108/72  Fetal Heart Rate: 125   Presentation: Vertex  Dilation: 1 Effacement (%): 40 Fetal Station: -1    Prenatal Labs  Urine Dip:  Lab Results   Component Value Date    KETONESU 40 (2+) (A) 2024     Lab Results   Component Value Date    HGB 11.6 (L) 2024    HCT 37.2 2024    ABO O 2024    HEPBSAG Nonreactive 2023     No results found for: \"PAPPA\", \"AFP\", \"HCG\", \"ESTRIOL\", \"INHBA\"  No results found for: \"GLUF\", \"GLUT1\", \"VRUAUCD4YL\", \"ZFSJLHC9GA\"    Imaging  The most recent ultrasound was performed on   with a study GA of   and EFW of  .          Assessment/Plan   Diagnoses and all orders for this visit:  38 weeks gestation of pregnancy ([facility])  HIV infection in mother during pregnancy, antepartum ([facility])  Intrauterine growth restriction (IUGR) affecting care of mother, third trimester, single gestation ([facility])    Patient here for 38-week visit with NST.  Main complaint is of intermittent abdominal pain cervical exam showed 1 cm dilation.  NST was done which irregular contraction pattern every 2 to 6 minutes.  Counseled patient that she is probably in latent labor and that it would not be unreasonable to present to triage for labor check.  Patient prefers to wait at home to present to triage if symptoms worsen.  Patient scheduled for labor induction on July 10.    Continue prenatal vitamin.  Labs reviewed.      Follow " up in 1 week for a routine prenatal visit.

## 2024-07-13 ENCOUNTER — HOSPITAL ENCOUNTER (OUTPATIENT)
Facility: HOSPITAL | Age: 29
Discharge: HOME | End: 2024-07-13
Attending: OBSTETRICS & GYNECOLOGY | Admitting: OBSTETRICS & GYNECOLOGY
Payer: COMMERCIAL

## 2024-07-13 VITALS
SYSTOLIC BLOOD PRESSURE: 103 MMHG | DIASTOLIC BLOOD PRESSURE: 75 MMHG | TEMPERATURE: 97.3 F | HEART RATE: 86 BPM | OXYGEN SATURATION: 100 % | RESPIRATION RATE: 16 BRPM

## 2024-07-13 PROCEDURE — 99213 OFFICE O/P EST LOW 20 MIN: CPT | Performed by: OBSTETRICS & GYNECOLOGY

## 2024-07-13 RX ORDER — LABETALOL HYDROCHLORIDE 5 MG/ML
20 INJECTION, SOLUTION INTRAVENOUS ONCE AS NEEDED
Status: DISCONTINUED | OUTPATIENT
Start: 2024-07-13 | End: 2024-07-13 | Stop reason: HOSPADM

## 2024-07-13 RX ORDER — HYDRALAZINE HYDROCHLORIDE 20 MG/ML
5 INJECTION INTRAMUSCULAR; INTRAVENOUS ONCE AS NEEDED
Status: DISCONTINUED | OUTPATIENT
Start: 2024-07-13 | End: 2024-07-13 | Stop reason: HOSPADM

## 2024-07-13 RX ORDER — NIFEDIPINE 10 MG/1
10 CAPSULE ORAL ONCE AS NEEDED
Status: DISCONTINUED | OUTPATIENT
Start: 2024-07-13 | End: 2024-07-13 | Stop reason: HOSPADM

## 2024-07-13 RX ORDER — ONDANSETRON HYDROCHLORIDE 2 MG/ML
4 INJECTION, SOLUTION INTRAVENOUS EVERY 6 HOURS PRN
Status: DISCONTINUED | OUTPATIENT
Start: 2024-07-13 | End: 2024-07-13 | Stop reason: HOSPADM

## 2024-07-13 RX ORDER — LIDOCAINE HYDROCHLORIDE 10 MG/ML
0.5 INJECTION INFILTRATION; PERINEURAL ONCE AS NEEDED
Status: DISCONTINUED | OUTPATIENT
Start: 2024-07-13 | End: 2024-07-13 | Stop reason: HOSPADM

## 2024-07-13 RX ORDER — ONDANSETRON 4 MG/1
4 TABLET, FILM COATED ORAL EVERY 6 HOURS PRN
Status: DISCONTINUED | OUTPATIENT
Start: 2024-07-13 | End: 2024-07-13 | Stop reason: HOSPADM

## 2024-07-13 NOTE — H&P
Obstetrical Triage History and Physical     Saba Salamanca is a 29 y.o. .    Chief Complaint: Contractions    Assessment/Plan    Threatened labor  -unchanged from office  -RNST    Active Problems:  There are no active Hospital Problems.      38w5d Problems (from 24 to present)       Problem Noted Resolved    Supervision of high risk pregnancy in third trimester ([facility]) 2024 by Buffy RANDOLPH MD No    Priority:  Medium      Overview Addendum 2024  5:24 PM by Buffy RANDOLPH MD     [x]  NT screening- WNL (2024)   [x]  cfDNA (ordered 2024)   [x]  Anatomy  [x]  Glucose screening/Third trimester labs  []   Tdap  []  GBS         Anemia in pregnancy ([facility]) 10/19/2023 by Corin Cooley No    Priority:  Medium      Panic anxiety syndrome 10/19/2023 by Corin Cooley No    Priority:  Medium      Hepatitis B immune 6/15/2023 by Corin Cooley No    Priority:  Medium      Overview Signed 10/19/2023  3:47 AM by Corin Cooley     Formatting of this note might be different from the original. Hep A & B immune, HB core +, Hep C neg (2022)         Severe episode of recurrent major depressive disorder ([facility]) 2022 by Corin Cooley No    Priority:  Medium      Overview Signed 10/19/2023  3:47 AM by Corin Cooley     Last Assessment & Plan: Formatting of this note might be different from the original. A: secondary, chronic/complex problem with mixed features, w/out psychotic features. Currently severe and resulting in disability/impaired fnx. With hx of SI and remote SA. + hopelessness P: START/RE-TRIAL FLUOXETINE 10mg HS, SSRI is first-line tx recommendation for MDD/Anxiety and PTSD; may be less sedating than alternatives and better tolerated; given clients c/o AE in the past, will start at a reduced dose and titrate very gradually         HIV infection in mother during pregnancy, antepartum ([facility]) 2021 by Corin Cooley No    Priority:  Medium      Overview Addendum  2024 11:21 AM by Buffy RANDOLPH MD     2023: CD4 936, HIV VL 30.   Biktarvy  [x]  Viral load, repeat CD4  3/7/2024: UD  2024: VL UD, CD4 857         History of spontaneous , currently pregnant ([facility]) 10/9/2018 by Buffy RANDOLPH MD 2024 by Buffy RANDOLPH MD          Simi Walter is here complaining of contractionsGood fetal movement. Denies vaginal bleeding.    C/O cramping since seen in office yesterday. Uncomfortable. No LOF or vaginal bleeding. For induction tomorrow. Good FM     Obstetrical History   OB History    Para Term  AB Living   6 2 2   3 2   SAB IAB Ectopic Multiple Live Births   3       2      # Outcome Date GA Lbr Dharmesh/2nd Weight Sex Type Anes PTL Lv   6 Current            5 Term 19 40w0d  2.863 kg F Vag-Spont EPI N NOEL   4 SAB 18 6w0d             Birth Comments: D&C   3 Term 16 38w0d  2.948 kg M Vag-Spont EPI N NOEL      Complications: Low maternal weight gain ([facility])   2 SAB            1 SAB                Past Medical History  Past Medical History:   Diagnosis Date    Acne 06/15/2023    Amenorrhea 10/19/2023    Double vision 10/19/2023    Dysuria 10/19/2023    Encounter for supervision of normal first pregnancy, first trimester ([facility]) 2016    Encounter for prenatal care in first trimester of first pregnancy    Encounter for supervision of normal first pregnancy, second trimester ([facility]) 2016    Encounter for prenatal care of first pregnancy, antepartum, second trimester    Encounter for supervision of normal first pregnancy, unspecified trimester ([facility]) 2016    Encounter for supervision of normal first pregnancy    Hematuria 10/19/2023    High risk sexual behavior 10/19/2023    History of spontaneous , currently pregnant ([facility]) 10/09/2018    Irregular menses 10/19/2023    Irritable bladder 10/19/2023    Leukorrhea 10/19/2023    Loss of appetite 10/19/2023    Low blood pressure  10/19/2023    Memory deficit 10/19/2023    Other fatigue 2022    Other mixed anxiety disorders 2022    Last Assessment & Plan: Formatting of this note might be different from the original. A: secondary, chronic/complex problem with global, trauma, and panic features, Currently severe and resulting in disability/impaired fnx. P: START/RE-TRIAL FLUOXETINE 10mg HS, SSRI is first-line tx recommendation for MDD/Anxiety and PTSD; may be less sedating than alternatives and better tolerated; given clients     Personal history of other diseases of the female genital tract 2016    History of irregular menstrual cycles    Poor appetite 06/15/2023    SAB (spontaneous ) ([facility]) 10/19/2023    Seizure-like activity ([facility]) 2022    Sleep disturbance 2022    Sleep paralysis 2022    Syncope and collapse 10/19/2023    Threatened spontaneous  ([facility]) 10/19/2023    Tremor of both hands 10/19/2023    Unspecified mood (affective) disorder ([facility]) 05/10/2022    Urine leukocytes 10/19/2023    Uterine size-date discrepancy, unspecified trimester ([facility]) 2016    Fetal size inconsistent with dates    Vaginal delivery ([facility]) 10/19/2023    Vitamin D deficiency 10/19/2023        Past Surgical History   History reviewed. No pertinent surgical history.    Social History  Social History     Tobacco Use    Smoking status: Never    Smokeless tobacco: Never   Substance Use Topics    Alcohol use: Never     Substance and Sexual Activity   Drug Use Never       Allergies  Milk     Medications  Medications Prior to Admission   Medication Sig Dispense Refill Last Dose    Biktarvy -25 mg tablet Take 1 tablet by mouth once daily. 90 tablet 2 2024    prenatal vitamin, iron-folic, (prenatal vit no.130-iron-folic) 27 mg iron-800 mcg folic acid tablet Take 1 tablet by mouth once daily. 90 tablet 0 2024    benzoyl peroxide 10 % bar Use to wash affected areas on body daily        "benzoyl peroxide 5 % external wash Apply 1 application. topically 2 times a day.       ferrous sulfate 325 (65 Fe) MG EC tablet Take 1 tablet by mouth once daily with breakfast. Do not crush, chew, or split. 90 tablet 3 Unknown       Objective    Last Vitals  Temp Pulse Resp BP MAP O2 Sat   36.3 °C (97.3 °F) 86 16 103/75   100 %     Physical Examination  GENERAL: Examination reveals a well developed, well nourished, gravid female in no acute distress. She is alert and cooperative.  HEENT: PERRLA. External ears normal. Nose normal, no erythema or discharge. Mouth and throat clear.  NECK: supple, no significant adenopathy  ABDOMEN: soft, gravid, nontender, nondistended, no abnormal masses, no epigastric pain  FHR is  , with  , and a   tracing.    [place] reading:    The fetus is in a vertex presentation, determined by vaginal exam  CERVIX:   1cm dilated,   40% effaced,   -1 station; MEMBRANES are  intact  EXTREMITIES: no redness or tenderness in the calves or thighs, no edema    Lab Review  No results found for: \"WBC\", \"HGB\", \"HCT\", \"PLT\"    "

## 2024-07-14 ENCOUNTER — ANESTHESIA EVENT (OUTPATIENT)
Dept: OBSTETRICS AND GYNECOLOGY | Facility: HOSPITAL | Age: 29
End: 2024-07-14
Payer: COMMERCIAL

## 2024-07-14 ENCOUNTER — HOSPITAL ENCOUNTER (INPATIENT)
Facility: HOSPITAL | Age: 29
LOS: 2 days | Discharge: HOME | End: 2024-07-16
Attending: OBSTETRICS & GYNECOLOGY | Admitting: OBSTETRICS & GYNECOLOGY
Payer: COMMERCIAL

## 2024-07-14 ENCOUNTER — ANESTHESIA (OUTPATIENT)
Dept: OBSTETRICS AND GYNECOLOGY | Facility: HOSPITAL | Age: 29
End: 2024-07-14
Payer: COMMERCIAL

## 2024-07-14 ENCOUNTER — APPOINTMENT (OUTPATIENT)
Dept: OBSTETRICS AND GYNECOLOGY | Facility: HOSPITAL | Age: 29
End: 2024-07-14
Payer: COMMERCIAL

## 2024-07-14 DIAGNOSIS — O98.719 HIV INFECTION IN MOTHER DURING PREGNANCY, ANTEPARTUM (HHS-HCC): ICD-10-CM

## 2024-07-14 DIAGNOSIS — O36.5930 INTRAUTERINE GROWTH RESTRICTION (IUGR) AFFECTING CARE OF MOTHER, THIRD TRIMESTER, SINGLE GESTATION (HHS-HCC): ICD-10-CM

## 2024-07-14 PROBLEM — Z3A.39 PREGNANCY WITH 39 COMPLETED WEEKS GESTATION (HHS-HCC): Status: ACTIVE | Noted: 2024-07-14

## 2024-07-14 PROBLEM — O36.5931: Status: ACTIVE | Noted: 2024-07-14

## 2024-07-14 LAB
ABO GROUP (TYPE) IN BLOOD: NORMAL
ANTIBODY SCREEN: NORMAL
ERYTHROCYTE [DISTWIDTH] IN BLOOD BY AUTOMATED COUNT: 14.9 % (ref 11.5–14.5)
HCT VFR BLD AUTO: 33.9 % (ref 36–46)
HGB BLD-MCNC: 11 G/DL (ref 12–16)
MCH RBC QN AUTO: 24.6 PG (ref 26–34)
MCHC RBC AUTO-ENTMCNC: 32.4 G/DL (ref 32–36)
MCV RBC AUTO: 76 FL (ref 80–100)
NRBC BLD-RTO: 0 /100 WBCS (ref 0–0)
PLATELET # BLD AUTO: 304 X10*3/UL (ref 150–450)
RBC # BLD AUTO: 4.47 X10*6/UL (ref 4–5.2)
RH FACTOR (ANTIGEN D): NORMAL
WBC # BLD AUTO: 9.4 X10*3/UL (ref 4.4–11.3)

## 2024-07-14 PROCEDURE — 1120000001 HC OB PRIVATE ROOM DAILY

## 2024-07-14 PROCEDURE — 2500000001 HC RX 250 WO HCPCS SELF ADMINISTERED DRUGS (ALT 637 FOR MEDICARE OP): Performed by: OBSTETRICS & GYNECOLOGY

## 2024-07-14 PROCEDURE — 86780 TREPONEMA PALLIDUM: CPT | Mod: AHULAB | Performed by: OBSTETRICS & GYNECOLOGY

## 2024-07-14 PROCEDURE — 85027 COMPLETE CBC AUTOMATED: CPT | Performed by: OBSTETRICS & GYNECOLOGY

## 2024-07-14 PROCEDURE — 86901 BLOOD TYPING SEROLOGIC RH(D): CPT | Performed by: OBSTETRICS & GYNECOLOGY

## 2024-07-14 PROCEDURE — 36415 COLL VENOUS BLD VENIPUNCTURE: CPT | Performed by: OBSTETRICS & GYNECOLOGY

## 2024-07-14 RX ORDER — LABETALOL HYDROCHLORIDE 5 MG/ML
20 INJECTION, SOLUTION INTRAVENOUS ONCE AS NEEDED
Status: DISCONTINUED | OUTPATIENT
Start: 2024-07-14 | End: 2024-07-15

## 2024-07-14 RX ORDER — LIDOCAINE HYDROCHLORIDE 10 MG/ML
30 INJECTION INFILTRATION; PERINEURAL ONCE AS NEEDED
Status: DISCONTINUED | OUTPATIENT
Start: 2024-07-14 | End: 2024-07-15

## 2024-07-14 RX ORDER — ONDANSETRON HYDROCHLORIDE 2 MG/ML
4 INJECTION, SOLUTION INTRAVENOUS EVERY 6 HOURS PRN
Status: DISCONTINUED | OUTPATIENT
Start: 2024-07-14 | End: 2024-07-15

## 2024-07-14 RX ORDER — METOCLOPRAMIDE 10 MG/1
10 TABLET ORAL EVERY 6 HOURS PRN
Status: DISCONTINUED | OUTPATIENT
Start: 2024-07-14 | End: 2024-07-16 | Stop reason: HOSPADM

## 2024-07-14 RX ORDER — MISOPROSTOL 200 UG/1
800 TABLET ORAL ONCE AS NEEDED
Status: DISCONTINUED | OUTPATIENT
Start: 2024-07-14 | End: 2024-07-15

## 2024-07-14 RX ORDER — CARBOPROST TROMETHAMINE 250 UG/ML
250 INJECTION, SOLUTION INTRAMUSCULAR ONCE AS NEEDED
Status: DISCONTINUED | OUTPATIENT
Start: 2024-07-14 | End: 2024-07-15

## 2024-07-14 RX ORDER — OXYTOCIN 10 [USP'U]/ML
10 INJECTION, SOLUTION INTRAMUSCULAR; INTRAVENOUS ONCE AS NEEDED
Status: DISCONTINUED | OUTPATIENT
Start: 2024-07-14 | End: 2024-07-15

## 2024-07-14 RX ORDER — LOPERAMIDE HYDROCHLORIDE 2 MG/1
4 CAPSULE ORAL EVERY 2 HOUR PRN
Status: DISCONTINUED | OUTPATIENT
Start: 2024-07-14 | End: 2024-07-15

## 2024-07-14 RX ORDER — METOCLOPRAMIDE HYDROCHLORIDE 5 MG/ML
10 INJECTION INTRAMUSCULAR; INTRAVENOUS EVERY 6 HOURS PRN
Status: DISCONTINUED | OUTPATIENT
Start: 2024-07-14 | End: 2024-07-16 | Stop reason: HOSPADM

## 2024-07-14 RX ORDER — TERBUTALINE SULFATE 1 MG/ML
0.25 INJECTION SUBCUTANEOUS ONCE AS NEEDED
Status: DISCONTINUED | OUTPATIENT
Start: 2024-07-14 | End: 2024-07-15

## 2024-07-14 RX ORDER — SODIUM CHLORIDE, SODIUM LACTATE, POTASSIUM CHLORIDE, CALCIUM CHLORIDE 600; 310; 30; 20 MG/100ML; MG/100ML; MG/100ML; MG/100ML
125 INJECTION, SOLUTION INTRAVENOUS CONTINUOUS
Status: DISCONTINUED | OUTPATIENT
Start: 2024-07-14 | End: 2024-07-16 | Stop reason: HOSPADM

## 2024-07-14 RX ORDER — HYDRALAZINE HYDROCHLORIDE 20 MG/ML
5 INJECTION INTRAMUSCULAR; INTRAVENOUS ONCE AS NEEDED
Status: DISCONTINUED | OUTPATIENT
Start: 2024-07-14 | End: 2024-07-15

## 2024-07-14 RX ORDER — METHYLERGONOVINE MALEATE 0.2 MG/ML
0.2 INJECTION INTRAVENOUS ONCE AS NEEDED
Status: DISCONTINUED | OUTPATIENT
Start: 2024-07-14 | End: 2024-07-15

## 2024-07-14 RX ORDER — OXYTOCIN/0.9 % SODIUM CHLORIDE 30/500 ML
60 PLASTIC BAG, INJECTION (ML) INTRAVENOUS ONCE AS NEEDED
Status: DISCONTINUED | OUTPATIENT
Start: 2024-07-14 | End: 2024-07-15

## 2024-07-14 RX ORDER — ONDANSETRON 4 MG/1
4 TABLET, FILM COATED ORAL EVERY 6 HOURS PRN
Status: DISCONTINUED | OUTPATIENT
Start: 2024-07-14 | End: 2024-07-15

## 2024-07-14 RX ORDER — NIFEDIPINE 10 MG/1
10 CAPSULE ORAL ONCE AS NEEDED
Status: DISCONTINUED | OUTPATIENT
Start: 2024-07-14 | End: 2024-07-15

## 2024-07-14 RX ORDER — TRANEXAMIC ACID 100 MG/ML
1000 INJECTION, SOLUTION INTRAVENOUS ONCE AS NEEDED
Status: DISCONTINUED | OUTPATIENT
Start: 2024-07-14 | End: 2024-07-15

## 2024-07-14 SDOH — HEALTH STABILITY: MENTAL HEALTH: NON-SPECIFIC ACTIVE SUICIDAL THOUGHTS (PAST 1 MONTH): NO

## 2024-07-14 SDOH — SOCIAL STABILITY: SOCIAL INSECURITY: ARE YOU OR HAVE YOU BEEN THREATENED OR ABUSED PHYSICALLY, EMOTIONALLY, OR SEXUALLY BY ANYONE?: NO

## 2024-07-14 SDOH — HEALTH STABILITY: MENTAL HEALTH: HAVE YOU USED ANY SUBSTANCES (CANABIS, COCAINE, HEROIN, HALLUCINOGENS, INHALANTS, ETC.) IN THE PAST 12 MONTHS?: NO

## 2024-07-14 SDOH — SOCIAL STABILITY: SOCIAL INSECURITY: DO YOU FEEL ANYONE HAS EXPLOITED OR TAKEN ADVANTAGE OF YOU FINANCIALLY OR OF YOUR PERSONAL PROPERTY?: NO

## 2024-07-14 SDOH — HEALTH STABILITY: MENTAL HEALTH: SUICIDAL BEHAVIOR (LIFETIME): NO

## 2024-07-14 SDOH — SOCIAL STABILITY: SOCIAL INSECURITY: ARE THERE ANY APPARENT SIGNS OF INJURIES/BEHAVIORS THAT COULD BE RELATED TO ABUSE/NEGLECT?: NO

## 2024-07-14 SDOH — SOCIAL STABILITY: SOCIAL INSECURITY: HAVE YOU HAD THOUGHTS OF HARMING ANYONE ELSE?: NO

## 2024-07-14 SDOH — SOCIAL STABILITY: SOCIAL INSECURITY: VERBAL ABUSE: DENIES

## 2024-07-14 SDOH — SOCIAL STABILITY: SOCIAL INSECURITY: PHYSICAL ABUSE: DENIES

## 2024-07-14 SDOH — ECONOMIC STABILITY: HOUSING INSECURITY: DO YOU FEEL UNSAFE GOING BACK TO THE PLACE WHERE YOU ARE LIVING?: NO

## 2024-07-14 SDOH — SOCIAL STABILITY: SOCIAL INSECURITY: DOES ANYONE TRY TO KEEP YOU FROM HAVING/CONTACTING OTHER FRIENDS OR DOING THINGS OUTSIDE YOUR HOME?: NO

## 2024-07-14 SDOH — HEALTH STABILITY: MENTAL HEALTH: HAVE YOU USED ANY PRESCRIPTION DRUGS OTHER THAN PRESCRIBED IN THE PAST 12 MONTHS?: NO

## 2024-07-14 SDOH — SOCIAL STABILITY: SOCIAL INSECURITY: HAVE YOU HAD ANY THOUGHTS OF HARMING ANYONE ELSE?: NO

## 2024-07-14 SDOH — SOCIAL STABILITY: SOCIAL INSECURITY: ABUSE SCREEN: ADULT

## 2024-07-14 SDOH — SOCIAL STABILITY: SOCIAL INSECURITY: HAS ANYONE EVER THREATENED TO HURT YOUR FAMILY OR YOUR PETS?: NO

## 2024-07-14 SDOH — HEALTH STABILITY: MENTAL HEALTH: WERE YOU ABLE TO COMPLETE ALL THE BEHAVIORAL HEALTH SCREENINGS?: YES

## 2024-07-14 SDOH — HEALTH STABILITY: MENTAL HEALTH: WISH TO BE DEAD (PAST 1 MONTH): NO

## 2024-07-14 ASSESSMENT — PAIN SCALES - GENERAL
PAINLEVEL_OUTOF10: 2

## 2024-07-14 ASSESSMENT — LIFESTYLE VARIABLES
HOW OFTEN DO YOU HAVE A DRINK CONTAINING ALCOHOL: NEVER
AUDIT-C TOTAL SCORE: 0
HOW MANY STANDARD DRINKS CONTAINING ALCOHOL DO YOU HAVE ON A TYPICAL DAY: PATIENT DOES NOT DRINK
HOW OFTEN DO YOU HAVE 6 OR MORE DRINKS ON ONE OCCASION: NEVER
AUDIT-C TOTAL SCORE: 0
SKIP TO QUESTIONS 9-10: 1

## 2024-07-14 ASSESSMENT — PATIENT HEALTH QUESTIONNAIRE - PHQ9
2. FEELING DOWN, DEPRESSED OR HOPELESS: NOT AT ALL
1. LITTLE INTEREST OR PLEASURE IN DOING THINGS: NOT AT ALL
SUM OF ALL RESPONSES TO PHQ9 QUESTIONS 1 & 2: 0

## 2024-07-15 ENCOUNTER — ANESTHESIA EVENT (OUTPATIENT)
Dept: OBSTETRICS AND GYNECOLOGY | Facility: HOSPITAL | Age: 29
End: 2024-07-15
Payer: COMMERCIAL

## 2024-07-15 ENCOUNTER — ANESTHESIA (OUTPATIENT)
Dept: OBSTETRICS AND GYNECOLOGY | Facility: HOSPITAL | Age: 29
End: 2024-07-15
Payer: COMMERCIAL

## 2024-07-15 LAB — TREPONEMA PALLIDUM IGG+IGM AB [PRESENCE] IN SERUM OR PLASMA BY IMMUNOASSAY: NONREACTIVE

## 2024-07-15 PROCEDURE — 2500000004 HC RX 250 GENERAL PHARMACY W/ HCPCS (ALT 636 FOR OP/ED): Performed by: OBSTETRICS & GYNECOLOGY

## 2024-07-15 PROCEDURE — 3700000014 EPIDURAL BLOCK: Performed by: NURSE ANESTHETIST, CERTIFIED REGISTERED

## 2024-07-15 PROCEDURE — 2500000004 HC RX 250 GENERAL PHARMACY W/ HCPCS (ALT 636 FOR OP/ED): Performed by: NURSE ANESTHETIST, CERTIFIED REGISTERED

## 2024-07-15 PROCEDURE — 10907ZC DRAINAGE OF AMNIOTIC FLUID, THERAPEUTIC FROM PRODUCTS OF CONCEPTION, VIA NATURAL OR ARTIFICIAL OPENING: ICD-10-PCS | Performed by: STUDENT IN AN ORGANIZED HEALTH CARE EDUCATION/TRAINING PROGRAM

## 2024-07-15 PROCEDURE — 3E033VJ INTRODUCTION OF OTHER HORMONE INTO PERIPHERAL VEIN, PERCUTANEOUS APPROACH: ICD-10-PCS | Performed by: STUDENT IN AN ORGANIZED HEALTH CARE EDUCATION/TRAINING PROGRAM

## 2024-07-15 PROCEDURE — 3E0P7VZ INTRODUCTION OF HORMONE INTO FEMALE REPRODUCTIVE, VIA NATURAL OR ARTIFICIAL OPENING: ICD-10-PCS | Performed by: STUDENT IN AN ORGANIZED HEALTH CARE EDUCATION/TRAINING PROGRAM

## 2024-07-15 PROCEDURE — 7210000002 HC LABOR PER HOUR

## 2024-07-15 PROCEDURE — 2500000005 HC RX 250 GENERAL PHARMACY W/O HCPCS: Performed by: NURSE ANESTHETIST, CERTIFIED REGISTERED

## 2024-07-15 PROCEDURE — 59410 OBSTETRICAL CARE: CPT | Performed by: STUDENT IN AN ORGANIZED HEALTH CARE EDUCATION/TRAINING PROGRAM

## 2024-07-15 PROCEDURE — 59050 FETAL MONITOR W/REPORT: CPT

## 2024-07-15 PROCEDURE — 7100000016 HC LABOR RECOVERY PER HOUR

## 2024-07-15 PROCEDURE — 2500000001 HC RX 250 WO HCPCS SELF ADMINISTERED DRUGS (ALT 637 FOR MEDICARE OP): Performed by: STUDENT IN AN ORGANIZED HEALTH CARE EDUCATION/TRAINING PROGRAM

## 2024-07-15 PROCEDURE — 1100000001 HC PRIVATE ROOM DAILY

## 2024-07-15 PROCEDURE — 59409 OBSTETRICAL CARE: CPT | Performed by: STUDENT IN AN ORGANIZED HEALTH CARE EDUCATION/TRAINING PROGRAM

## 2024-07-15 PROCEDURE — 2500000001 HC RX 250 WO HCPCS SELF ADMINISTERED DRUGS (ALT 637 FOR MEDICARE OP): Performed by: MIDWIFE

## 2024-07-15 RX ORDER — OXYTOCIN 10 [USP'U]/ML
10 INJECTION, SOLUTION INTRAMUSCULAR; INTRAVENOUS ONCE AS NEEDED
Status: DISCONTINUED | OUTPATIENT
Start: 2024-07-15 | End: 2024-07-16 | Stop reason: HOSPADM

## 2024-07-15 RX ORDER — OXYTOCIN/0.9 % SODIUM CHLORIDE 30/500 ML
60 PLASTIC BAG, INJECTION (ML) INTRAVENOUS ONCE AS NEEDED
Status: DISCONTINUED | OUTPATIENT
Start: 2024-07-15 | End: 2024-07-16 | Stop reason: HOSPADM

## 2024-07-15 RX ORDER — OXYTOCIN/0.9 % SODIUM CHLORIDE 30/500 ML
2-30 PLASTIC BAG, INJECTION (ML) INTRAVENOUS CONTINUOUS
Status: DISCONTINUED | OUTPATIENT
Start: 2024-07-15 | End: 2024-07-16 | Stop reason: HOSPADM

## 2024-07-15 RX ORDER — SIMETHICONE 80 MG
80 TABLET,CHEWABLE ORAL 4 TIMES DAILY PRN
Status: DISCONTINUED | OUTPATIENT
Start: 2024-07-15 | End: 2024-07-16 | Stop reason: HOSPADM

## 2024-07-15 RX ORDER — FENTANYL/ROPIVACAINE/NS/PF 2MCG/ML-.2
0-25 PLASTIC BAG, INJECTION (ML) INJECTION CONTINUOUS
Status: DISCONTINUED | OUTPATIENT
Start: 2024-07-15 | End: 2024-07-16 | Stop reason: HOSPADM

## 2024-07-15 RX ORDER — CARBOPROST TROMETHAMINE 250 UG/ML
250 INJECTION, SOLUTION INTRAMUSCULAR ONCE AS NEEDED
Status: DISCONTINUED | OUTPATIENT
Start: 2024-07-15 | End: 2024-07-16 | Stop reason: HOSPADM

## 2024-07-15 RX ORDER — LOPERAMIDE HYDROCHLORIDE 2 MG/1
4 CAPSULE ORAL EVERY 2 HOUR PRN
Status: DISCONTINUED | OUTPATIENT
Start: 2024-07-15 | End: 2024-07-16 | Stop reason: HOSPADM

## 2024-07-15 RX ORDER — METHYLERGONOVINE MALEATE 0.2 MG/ML
0.2 INJECTION INTRAVENOUS ONCE AS NEEDED
Status: DISCONTINUED | OUTPATIENT
Start: 2024-07-15 | End: 2024-07-16 | Stop reason: HOSPADM

## 2024-07-15 RX ORDER — CABERGOLINE 0.5 MG/1
1 TABLET ORAL ONCE
Status: DISCONTINUED | OUTPATIENT
Start: 2024-07-16 | End: 2024-07-15

## 2024-07-15 RX ORDER — ONDANSETRON 4 MG/1
4 TABLET, FILM COATED ORAL EVERY 6 HOURS PRN
Status: DISCONTINUED | OUTPATIENT
Start: 2024-07-15 | End: 2024-07-16 | Stop reason: HOSPADM

## 2024-07-15 RX ORDER — IBUPROFEN 600 MG/1
600 TABLET ORAL EVERY 6 HOURS
Status: DISCONTINUED | OUTPATIENT
Start: 2024-07-15 | End: 2024-07-16 | Stop reason: HOSPADM

## 2024-07-15 RX ORDER — ACETAMINOPHEN 325 MG/1
975 TABLET ORAL EVERY 6 HOURS
Status: DISCONTINUED | OUTPATIENT
Start: 2024-07-15 | End: 2024-07-16 | Stop reason: HOSPADM

## 2024-07-15 RX ORDER — POLYETHYLENE GLYCOL 3350 17 G/17G
17 POWDER, FOR SOLUTION ORAL 2 TIMES DAILY PRN
Status: DISCONTINUED | OUTPATIENT
Start: 2024-07-15 | End: 2024-07-16 | Stop reason: HOSPADM

## 2024-07-15 RX ORDER — BISACODYL 10 MG/1
10 SUPPOSITORY RECTAL DAILY PRN
Status: DISCONTINUED | OUTPATIENT
Start: 2024-07-15 | End: 2024-07-16 | Stop reason: HOSPADM

## 2024-07-15 RX ORDER — MISOPROSTOL 200 UG/1
800 TABLET ORAL ONCE AS NEEDED
Status: DISCONTINUED | OUTPATIENT
Start: 2024-07-15 | End: 2024-07-16 | Stop reason: HOSPADM

## 2024-07-15 RX ORDER — LIDOCAINE HYDROCHLORIDE AND EPINEPHRINE 15; 5 MG/ML; UG/ML
INJECTION, SOLUTION EPIDURAL AS NEEDED
Status: DISCONTINUED | OUTPATIENT
Start: 2024-07-15 | End: 2024-07-15

## 2024-07-15 RX ORDER — TRANEXAMIC ACID 100 MG/ML
1000 INJECTION, SOLUTION INTRAVENOUS ONCE AS NEEDED
Status: DISCONTINUED | OUTPATIENT
Start: 2024-07-15 | End: 2024-07-16 | Stop reason: HOSPADM

## 2024-07-15 RX ORDER — DIPHENHYDRAMINE HYDROCHLORIDE 50 MG/ML
25 INJECTION INTRAMUSCULAR; INTRAVENOUS EVERY 6 HOURS PRN
Status: DISCONTINUED | OUTPATIENT
Start: 2024-07-15 | End: 2024-07-16 | Stop reason: HOSPADM

## 2024-07-15 RX ORDER — LABETALOL HYDROCHLORIDE 5 MG/ML
20 INJECTION, SOLUTION INTRAVENOUS ONCE AS NEEDED
Status: DISCONTINUED | OUTPATIENT
Start: 2024-07-15 | End: 2024-07-16 | Stop reason: HOSPADM

## 2024-07-15 RX ORDER — NIFEDIPINE 10 MG/1
10 CAPSULE ORAL ONCE AS NEEDED
Status: DISCONTINUED | OUTPATIENT
Start: 2024-07-15 | End: 2024-07-16 | Stop reason: HOSPADM

## 2024-07-15 RX ORDER — ONDANSETRON HYDROCHLORIDE 2 MG/ML
4 INJECTION, SOLUTION INTRAVENOUS EVERY 6 HOURS PRN
Status: DISCONTINUED | OUTPATIENT
Start: 2024-07-15 | End: 2024-07-16 | Stop reason: HOSPADM

## 2024-07-15 RX ORDER — LIDOCAINE 560 MG/1
1 PATCH PERCUTANEOUS; TOPICAL; TRANSDERMAL
Status: DISCONTINUED | OUTPATIENT
Start: 2024-07-15 | End: 2024-07-16 | Stop reason: HOSPADM

## 2024-07-15 RX ORDER — HYDRALAZINE HYDROCHLORIDE 20 MG/ML
5 INJECTION INTRAMUSCULAR; INTRAVENOUS ONCE AS NEEDED
Status: DISCONTINUED | OUTPATIENT
Start: 2024-07-15 | End: 2024-07-16 | Stop reason: HOSPADM

## 2024-07-15 RX ORDER — ADHESIVE BANDAGE
10 BANDAGE TOPICAL
Status: DISCONTINUED | OUTPATIENT
Start: 2024-07-15 | End: 2024-07-16 | Stop reason: HOSPADM

## 2024-07-15 RX ORDER — DIPHENHYDRAMINE HCL 25 MG
25 CAPSULE ORAL EVERY 6 HOURS PRN
Status: DISCONTINUED | OUTPATIENT
Start: 2024-07-15 | End: 2024-07-16 | Stop reason: HOSPADM

## 2024-07-15 RX ORDER — CABERGOLINE 0.5 MG/1
1 TABLET ORAL ONCE
Status: DISCONTINUED | OUTPATIENT
Start: 2024-07-15 | End: 2024-07-16 | Stop reason: HOSPADM

## 2024-07-15 ASSESSMENT — PAIN DESCRIPTION - DESCRIPTORS
DESCRIPTORS: CRAMPING

## 2024-07-15 ASSESSMENT — PAIN SCALES - GENERAL
PAINLEVEL_OUTOF10: 7
PAINLEVEL_OUTOF10: 10 - WORST POSSIBLE PAIN
PAINLEVEL_OUTOF10: 3
PAINLEVEL_OUTOF10: 8
PAINLEVEL_OUTOF10: 1
PAINLEVEL_OUTOF10: 0 - NO PAIN
PAINLEVEL_OUTOF10: 2
PAINLEVEL_OUTOF10: 0 - NO PAIN
PAINLEVEL_OUTOF10: 2
PAIN_LEVEL: 0
PAINLEVEL_OUTOF10: 5 - MODERATE PAIN
PAINLEVEL_OUTOF10: 2
PAINLEVEL_OUTOF10: 0 - NO PAIN
PAINLEVEL_OUTOF10: 2

## 2024-07-15 ASSESSMENT — ACTIVITIES OF DAILY LIVING (ADL): LACK_OF_TRANSPORTATION: NO

## 2024-07-15 ASSESSMENT — PAIN DESCRIPTION - LOCATION: LOCATION: ABDOMEN

## 2024-07-15 NOTE — DISCHARGE INSTR - OTHER ORDERS

## 2024-07-15 NOTE — ANESTHESIA PROCEDURE NOTES
Epidural Block    Patient location during procedure: OB  Start time: 7/15/2024 2:35 AM  End time: 7/15/2024 2:57 AM  Reason for block: labor analgesia  Staffing  Performed: CRNA   Authorized by: CORINNE Kee    Performed by: CORINNE Kee    Preanesthetic Checklist  Completed: patient identified, IV checked, risks and benefits discussed, surgical consent, pre-op evaluation, timeout performed and sterile techniques followed  Block Timeout  RN/Licensed healthcare professional reads aloud to the Anesthesia provider and entire team: Patient identity, procedure with side and site, patient position, and as applicable the availability of implants/special equipment/special requirements.  Patient on coagulant treatment: no  Timeout performed at: 7/15/2024 2:38 AM  Block Placement  Patient position: sitting  Prep: ChloraPrep  Sterility prep: cap, drape, gloves and hand  Sedation level: no sedation  Patient monitoring: continuous pulse oximetry and blood pressure  Approach: midline  Local numbing: lidocaine 1% to skin and subcutaneous tissues  Vertebral space: lumbar  Lumbar location: L3-L4  Epidural  Loss of resistance technique: saline  Guidance: landmark technique        Needle  Needle type: Tuohy   Needle gauge: 17  Needle length: 9 cm  Needle insertion depth: 6 cm  Catheter type: end hole  Catheter size: 18 G  Catheter at skin depth: 12 cm  Catheter securement method: clear occlusive dressing    Test dose: lidocaine 1.5% with epinephrine 1-to-200,000  Test dose: lidocaine 1.5% with epinephrine 1-to-200,000  Test dose result: no positive test dose    PCEA  Medication concentration used: 0.2% Ropivacaine with 2 mcg/mL Fentanyl  Dose (mL): 4  Lockout (minutes): 30  1-Hour Limit (boluses/hr): 2  Basal Rate: 8        Assessment  Sensory level: T10 bilateral  Block outcome: patient comfortable  Number of attempts: 1  Events: no positive test dose  Procedure assessment: patient tolerated procedure well  with no immediate complications  Additional Notes  1% LIDO LOCALIZATION. NEG HEME, NEG CSF, NEG PARESTHESIA. DEJUAN AFTER SUPINE

## 2024-07-15 NOTE — ANESTHESIA PREPROCEDURE EVALUATION
Patient: Saba Salamanca    Evaluation Method: In-person visit    Procedure Information    Date: 24  Procedure: Labor Consult      39wk0d IOL FGR,  x 2 with good working epidurals. HIV + (low viral load), h/o PTSD, Anemia, anxiety/depression. Desires epidural. Will accept blood products.     Relevant Problems   Neuro   (+) Depressive disorder   (+) PTSD (post-traumatic stress disorder)   (+) Panic anxiety syndrome   (+) Severe episode of recurrent major depressive disorder ([facility])      Hematology   (+) Anemia in pregnancy ([facility])      ID   (+) HIV infection in mother during pregnancy, antepartum ([facility])      GYN   (+) Supervision of high risk pregnancy in third trimester ([facility])       Clinical information reviewed:   Tobacco  Allergies  Meds  Problems  Med Hx  Surg Hx   Fam Hx          NPO Detail:  NPO/Void Status  Date of Last Liquid: 24  Time of Last Liquid: 2100  Date of Last Solid: 24  Time of Last Solid: 1800         OB/Gyn Evaluation    Present Pregnancy    Patient is pregnant now.   Obstetric History            Physical Exam    Airway  Mallampati: II  TM distance: >3 FB  Neck ROM: full     Cardiovascular - normal exam  Rhythm: regular  Rate: normal     Dental    Pulmonary - normal exam     Abdominal - normal exam         Anesthesia Plan    History of general anesthesia?: yes  History of complications of general anesthesia?: no    ASA 3     epidural     Anesthetic plan and risks discussed with patient.  Use of blood products discussed with patient who consented to blood products.    Plan discussed with CRNA.

## 2024-07-15 NOTE — ANESTHESIA POSTPROCEDURE EVALUATION
Patient: Saba Salamanca    Procedure Summary       Date: 07/15/24 Room / Location:     Anesthesia Start: 0235 Anesthesia Stop: 0916    Procedure: Labor Analgesia Diagnosis:     Scheduled Providers:  Responsible Provider: CORINNE Cavanaugh    Anesthesia Type: epidural ASA Status: 3            Anesthesia Type: epidural      7/15/2024    11:35 AM 7/15/2024    11:40 AM 7/15/2024    11:44 AM 7/15/2024    11:45 AM 7/15/2024    11:50 AM 7/15/2024    11:55 AM 7/15/2024    12:26 PM   Vitals   Systolic   115    103   Diastolic   76    71   Heart Rate 61 72 72 68 72 71 67   Temp       36.2 °C (97.2 °F)   Resp       18       Anesthesia Post Evaluation    Patient location during evaluation: bedside  Patient participation: complete - patient participated  Level of consciousness: awake and alert  Pain score: 0  Pain management: adequate  Airway patency: patent  Cardiovascular status: acceptable  Respiratory status: acceptable  Hydration status: acceptable  Postoperative Nausea and Vomiting: none  Comments: Epidural catheter removed by nursing. No redness, swelling, or drainage at puncture site.    Complete resolution of numbness. Patient is able to lift legs, bend at the knees, and ambulate.    Patient denies problems with urination.    Patient denies nausea, headache or severe back pain.         No notable events documented.

## 2024-07-15 NOTE — PROGRESS NOTES
FHT's 135 with accels and moderate variability  [place] q4  Next dose of cyto held because of acuity on unit. Will place when able.

## 2024-07-15 NOTE — L&D DELIVERY NOTE
OB Delivery Note  7/15/2024  Saba Salamanca  29 y.o.           Gestational Age: 39w1d  /Para:   Quantitative Blood Loss: Admission to Discharge: 0 mL (2024  7:06 PM - 7/15/2024  9:40 AM)    Marina Salamanca [[id number]]      Labor Events    Rupture date/time: 7/15/2024 0510  Rupture type: Artificial  Fluid color: Clear  Fluid odor: None  Labor type: Induced Onset of Labor  Labor allowed to proceed with plans for an attempted vaginal birth?: Yes  Induction: Misoprostol, AROM, [place]/EASI  Induction indications: Fetal Abnormality  Complications: None       Placenta    Placenta delivery date/time:   Placenta removal:        Lacerations    Episiotomy: None  Perineal laceration: None  Labial laceration?: Yes  Labial laceration location: bilateral  Labial laceration repaired?: No  Repair suture: None       Operative Delivery    Forceps attempted?: No  Vacuum extractor attempted?: No       Shoulder Dystocia    Shoulder dystocia present?: No       [place] Delivery    Birth date/time: 7/15/2024 09:16:00  Delivery type:   Complications: None       Apgars    Living status:   Apgar Component Scores:  1 min.:  5 min.:  10 min.:  15 min.:  20 min.:    Skin color:         Heart rate:         Reflex irritability:         Muscle tone:         Respiratory effort:         Total:                Delivery Providers    Delivering clinician: Wilner Borges MD   Provider Role     Delivery Nurse     Nursery Nurse     Resident               MD called to bedside as patient wqas reporting desire to push. SVE revealed pt was 10/100/+2.  Patient was placed into the lithotomy position.  Pt was asked to push and fetal head delivered spontaneously.  Fetal shoulder delivered with gentle downward traction and the remainder of the body delivered without incident.  No nuchal cord was noted.   was noted to be vigorous and moving all extremities with spontaneous cry.  Delayed cord clamping of 60 seconds was performed.  Cord  blood was collected but cord gases were not obtained.  Placenta then delivered spontaneously with gentle cord traction.  The vagina was examined and no perineal or vaginal lacerations were noted.  2 small bilateral labial accelerations were noted to be hemostatic and left unrepaired.  Small amount of clot was removed from the lower uterine segment.  The fundus was noted to be firm and the patient was hemostatic.  100 cc of blood was in the bag at the end of the case.  Final QBL pending.  The patient was returned to the supine position and laboratory, labor and delivery with her infant.      Wilner Borges MD

## 2024-07-15 NOTE — ANESTHESIA PREPROCEDURE EVALUATION
Patient: Saba Salamanca    Evaluation Method: In-person visit    Procedure Information    Date: 24  Procedure: Labor Consult      39wk0d IOL FGR,  x 2 with good working epidurals. HIV + (low viral load), h/o PTSD, Anemia, anxiety/depression. Desires epidural. Will accept blood products.     Relevant Problems   Neuro   (+) Depressive disorder   (+) PTSD (post-traumatic stress disorder)   (+) Panic anxiety syndrome   (+) Severe episode of recurrent major depressive disorder ([facility])      Hematology   (+) Anemia in pregnancy ([facility])      ID   (+) HIV infection in mother during pregnancy, antepartum ([facility])      GYN   (+) Supervision of high risk pregnancy in third trimester ([facility])       Clinical information reviewed:   Tobacco  Allergies  Meds  Problems  Med Hx  Surg Hx   Fam Hx          NPO Detail:  NPO/Void Status  Date of Last Liquid: 24  Time of Last Liquid: 2100  Date of Last Solid: 24  Time of Last Solid: 1800         OB/Gyn Evaluation    Present Pregnancy    Patient is pregnant now.   Obstetric History                Physical Exam    Airway  Mallampati: II  TM distance: >3 FB  Neck ROM: full     Cardiovascular - normal exam  Rhythm: regular  Rate: normal     Dental    Pulmonary - normal exam     Abdominal - normal exam           Anesthesia Plan    History of general anesthesia?: yes  History of complications of general anesthesia?: no    ASA 3     epidural     Anesthetic plan and risks discussed with patient.  Use of blood products discussed with patient who consented to blood products.    Plan discussed with CRNA.

## 2024-07-15 NOTE — H&P
Obstetrical Admission History and Physical     Saba Salamanca is a 29 y.o.  at 39w0d. TADEO: 2024, by Ultrasound. Estimated fetal weight: 2709gm on 24. She has had prenatal care with Dr. Borges .    Chief Complaint: IOL    Assessment/Plan    IUP at 39  FGR- AC 8%, EFW 15%  HIV pos- last viral load 2024- Not detected    Principal Problem:    Pregnancy with 39 completed weeks gestation ([facility])  Active Problems:    Small for dates affecting management of mother, third trimester, fetus 1 ([facility])      38w5d Problems (from 24 to present)       Problem Noted Resolved    Small for dates affecting management of mother, third trimester, fetus 1 ([facility]) 2024 by Gin Styles, DO No    Priority:  Medium      Pregnancy with 39 completed weeks gestation ([facility]) 2024 by Gin Styles, DO No    Priority:  Medium      Supervision of high risk pregnancy in third trimester ([facility]) 2024 by Buffy RANDOLPH MD No    Priority:  Medium      Overview Addendum 2024  5:24 PM by Buffy RANDOLPH MD     [x]  NT screening- WNL (2024)   [x]  cfDNA (ordered 2024)   [x]  Anatomy  [x]  Glucose screening/Third trimester labs  []   Tdap  []  GBS         Anemia in pregnancy ([facility]) 10/19/2023 by Corin Cooley No    Priority:  Medium      Panic anxiety syndrome 10/19/2023 by Corin Cooley No    Priority:  Medium      Hepatitis B immune 6/15/2023 by Corin Cooley No    Priority:  Medium      Overview Signed 10/19/2023  3:47 AM by Corin Cooley     Formatting of this note might be different from the original. Hep A & B immune, HB core +, Hep C neg (2022)         Severe episode of recurrent major depressive disorder ([facility]) 2022 by Corin Cooley No    Priority:  Medium      Overview Signed 10/19/2023  3:47 AM by Corin Cooley     Last Assessment & Plan: Formatting of this note might be different from the original. A: secondary, chronic/complex problem with mixed  features, w/out psychotic features. Currently severe and resulting in disability/impaired fnx. With hx of SI and remote SA. + hopelessness P: START/RE-TRIAL FLUOXETINE 10mg HS, SSRI is first-line tx recommendation for MDD/Anxiety and PTSD; may be less sedating than alternatives and better tolerated; given clients c/o AE in the past, will start at a reduced dose and titrate very gradually         HIV infection in mother during pregnancy, antepartum ([facility]) 2021 by Corin Cooley No    Priority:  Medium      Overview Addendum 2024 11:21 AM by Buffy RANDOLPH MD     2023: CD4 936, HIV VL 30.   Biktarvy  [x]  Viral load, repeat CD4  3/7/2024: UD  2024: VL UD, CD4 857         History of spontaneous , currently pregnant ([facility]) 10/9/2018 by Buffy RANDOLPH MD 2024 by Buffy RANDOLPH MD          Options for delivery have been discussed with the patient and she elects for an induction of labor.  Cervical ripening with cytotec, cervidil, other prostaglandin agents has been discussed.  Induction of labor with pitocin, amniotomy, cytotec, and cervical balloon have been discussed in detail. The risks, benefits, complications, alternatives, expected outcomes, potential problems during recuperation and recovery, and the risks of not performing the procedure were discussed with the patient. The patient stated understanding that the risks of delivery include, but are not limited to: death; reaction to medications; injury to bowel, bladder, ureters, uterus, cervix, vagina, and other pelvic and abdominal structures, infection; blood loss and possible need for transfusion; and potential need for surgery, including hysterectomy. The risks of injury to the infant during delivery were also discussed. All questions were answered. There was concurrence with the planned procedure, and the patient wanted to proceed.    Admit to inpatient status. I anticipate that this patient will require a stay  exceeding at least 2 midnights for delivery and postpartum.  Induction of labor.  Management of pregnancy complications, as indicated.    Subjective   Good fetal movement. Denies vaginal bleeding., Denies contractions., Denies leaking of fluid.       Reason for Induction of Labor:  Intrauterine growth restriction, documented by serial ultrasounds         Obstetrical History   OB History    Para Term  AB Living   6 2 2   3 2   SAB IAB Ectopic Multiple Live Births   3       2      # Outcome Date GA Lbr Dharmesh/2nd Weight Sex Type Anes PTL Lv   6 Current            5 Term 19 40w0d  2.863 kg F Vag-Spont EPI N NOEL   4 SAB 18 6w0d             Birth Comments: D&C   3 Term 16 38w0d  2.948 kg M Vag-Spont EPI N NOEL      Complications: Low maternal weight gain ([facility])   2 SAB            1 SAB                Past Medical History  Past Medical History:   Diagnosis Date    Acne 06/15/2023    Amenorrhea 10/19/2023    Double vision 10/19/2023    Dysuria 10/19/2023    Encounter for supervision of normal first pregnancy, first trimester ([facility]) 2016    Encounter for prenatal care in first trimester of first pregnancy    Encounter for supervision of normal first pregnancy, second trimester ([facility]) 2016    Encounter for prenatal care of first pregnancy, antepartum, second trimester    Encounter for supervision of normal first pregnancy, unspecified trimester ([facility]) 2016    Encounter for supervision of normal first pregnancy    Hematuria 10/19/2023    High risk sexual behavior 10/19/2023    History of spontaneous , currently pregnant ([facility]) 10/09/2018    Irregular menses 10/19/2023    Irritable bladder 10/19/2023    Leukorrhea 10/19/2023    Loss of appetite 10/19/2023    Low blood pressure 10/19/2023    Memory deficit 10/19/2023    Other fatigue 2022    Other mixed anxiety disorders 2022    Last Assessment & Plan: Formatting of this note might be different  from the original. A: secondary, chronic/complex problem with global, trauma, and panic features, Currently severe and resulting in disability/impaired fnx. P: START/RE-TRIAL FLUOXETINE 10mg HS, SSRI is first-line tx recommendation for MDD/Anxiety and PTSD; may be less sedating than alternatives and better tolerated; given clients     Personal history of other diseases of the female genital tract 2016    History of irregular menstrual cycles    Poor appetite 06/15/2023    SAB (spontaneous ) ([facility]) 10/19/2023    Seizure-like activity ([facility]) 2022    Sleep disturbance 2022    Sleep paralysis 2022    Syncope and collapse 10/19/2023    Threatened spontaneous  ([facility]) 10/19/2023    Tremor of both hands 10/19/2023    Unspecified mood (affective) disorder ([facility]) 05/10/2022    Urine leukocytes 10/19/2023    Uterine size-date discrepancy, unspecified trimester ([facility]) 2016    Fetal size inconsistent with dates    Vaginal delivery ([facility]) 10/19/2023    Vitamin D deficiency 10/19/2023        Past Surgical History   No past surgical history on file.    Social History  Social History     Tobacco Use    Smoking status: Never    Smokeless tobacco: Never   Substance Use Topics    Alcohol use: Never     Substance and Sexual Activity   Drug Use Never       Allergies  Milk     Medications  Medications Prior to Admission   Medication Sig Dispense Refill Last Dose    benzoyl peroxide 10 % bar Use to wash affected areas on body daily       benzoyl peroxide 5 % external wash Apply 1 application. topically 2 times a day.       Biktarvy -25 mg tablet Take 1 tablet by mouth once daily. 90 tablet 2     ferrous sulfate 325 (65 Fe) MG EC tablet Take 1 tablet by mouth once daily with breakfast. Do not crush, chew, or split. 90 tablet 3     prenatal vitamin, iron-folic, (prenatal vit no.130-iron-folic) 27 mg iron-800 mcg folic acid tablet Take 1 tablet by mouth once daily. 90 tablet  0        Objective    Last Vitals  Temp Pulse Resp BP MAP O2 Sat                   Physical Examination  GENERAL: Examination reveals a well developed, well nourished, gravid female in no acute distress. She is alert and cooperative.  LUNGS: clear to auscultation bilaterally  ABDOMEN: soft, gravid, nontender, nondistended, no abnormal masses, no epigastric pain  FHR is 130 , with accels , and a cat 1  tracing.    [place] reading: occasional   The fetus is in a vertex presentation, determined by ultrasound  CERVIX:  1/50/-3 ; MEMBRANES are intact   EXTREMITIES: no redness or tenderness in the calves or thighs, no edema  NEUROLOGICAL: alert, oriented, normal speech, no focal findings or movement disorder noted  PSYCHOLOGICAL: awake and alert; oriented to person, place, and time    Lab Review  Labs in chart were reviewed.

## 2024-07-15 NOTE — PROGRESS NOTES
Pt comfortable with epidural  SVE 3/80/-2  AROM for clear fluid  FHT's 130 with accels and moderate variability  [place] q3-4  Consider pit augmentation if contractions space.

## 2024-07-16 ENCOUNTER — PHARMACY VISIT (OUTPATIENT)
Dept: PHARMACY | Facility: CLINIC | Age: 29
End: 2024-07-16
Payer: MEDICARE

## 2024-07-16 VITALS
HEART RATE: 58 BPM | RESPIRATION RATE: 16 BRPM | HEIGHT: 65 IN | SYSTOLIC BLOOD PRESSURE: 118 MMHG | BODY MASS INDEX: 30.08 KG/M2 | WEIGHT: 180.56 LBS | OXYGEN SATURATION: 99 % | DIASTOLIC BLOOD PRESSURE: 87 MMHG | TEMPERATURE: 98.4 F

## 2024-07-16 PROBLEM — O09.93 SUPERVISION OF HIGH RISK PREGNANCY IN THIRD TRIMESTER (HHS-HCC): Status: RESOLVED | Noted: 2024-01-08 | Resolved: 2024-07-16

## 2024-07-16 PROBLEM — O36.5931: Status: RESOLVED | Noted: 2024-07-14 | Resolved: 2024-07-16

## 2024-07-16 PROBLEM — Z3A.39 PREGNANCY WITH 39 COMPLETED WEEKS GESTATION (HHS-HCC): Status: RESOLVED | Noted: 2024-07-14 | Resolved: 2024-07-16

## 2024-07-16 PROBLEM — O99.019 ANEMIA IN PREGNANCY (HHS-HCC): Status: RESOLVED | Noted: 2023-10-19 | Resolved: 2024-07-16

## 2024-07-16 PROBLEM — F32.A DEPRESSIVE DISORDER: Status: RESOLVED | Noted: 2021-03-22 | Resolved: 2024-07-16

## 2024-07-16 PROCEDURE — 2500000001 HC RX 250 WO HCPCS SELF ADMINISTERED DRUGS (ALT 637 FOR MEDICARE OP): Performed by: MIDWIFE

## 2024-07-16 PROCEDURE — RXMED WILLOW AMBULATORY MEDICATION CHARGE

## 2024-07-16 PROCEDURE — 2500000005 HC RX 250 GENERAL PHARMACY W/O HCPCS: Performed by: MIDWIFE

## 2024-07-16 PROCEDURE — 2500000004 HC RX 250 GENERAL PHARMACY W/ HCPCS (ALT 636 FOR OP/ED): Performed by: OBSTETRICS & GYNECOLOGY

## 2024-07-16 RX ORDER — ACETAMINOPHEN 500 MG
1000 TABLET ORAL EVERY 6 HOURS PRN
Qty: 30 TABLET | Refills: 0 | Status: SHIPPED | OUTPATIENT
Start: 2024-07-16

## 2024-07-16 RX ORDER — MEDROXYPROGESTERONE ACETATE 150 MG/ML
150 INJECTION, SUSPENSION INTRAMUSCULAR ONCE
Status: COMPLETED | OUTPATIENT
Start: 2024-07-16 | End: 2024-07-16

## 2024-07-16 RX ORDER — IBUPROFEN 800 MG/1
800 TABLET ORAL EVERY 8 HOURS PRN
Qty: 30 TABLET | Refills: 0 | Status: SHIPPED | OUTPATIENT
Start: 2024-07-16

## 2024-07-16 ASSESSMENT — PAIN SCALES - GENERAL
PAINLEVEL_OUTOF10: 4
PAINLEVEL_OUTOF10: 8
PAINLEVEL_OUTOF10: 0 - NO PAIN

## 2024-07-16 ASSESSMENT — PAIN DESCRIPTION - DESCRIPTORS: DESCRIPTORS: CRAMPING

## 2024-07-16 NOTE — CARE PLAN
The patient's goals for the shift include bond with baby    The clinical goals for the shift include to bond with baby    Over the shift, the patient is meeting postpartum milestones and is approved for discharge per Dr Hewitt.  RN educated patient on discharge instructions, medication management and when to seek medical attention.  Patient acknowledges and assumes responsibility/

## 2024-07-16 NOTE — DISCHARGE SUMMARY
Discharge Summary    Admission Date: 2024  Discharge Date: 2024    Discharge Diagnosis  Pregnancy with 39 completed weeks gestation ([facility])    Hospital Course  Delivery Date: 7/15/2024 9:16 AM  Delivery type: Vaginal, Spontaneous   GA at delivery: 39w1d  Outcome: Living  Anesthesia during delivery: Epidural  Intrapartum complications: None  Feeding method: Breastfeeding Status: Unknown     Procedures:       Discharge Meds     Your medication list        START taking these medications        Instructions Last Dose Given Next Dose Due   acetaminophen 500 mg tablet  Commonly known as: Tylenol Extra Strength      Take 2 tablets (1,000 mg) by mouth every 6 hours if needed for mild pain (1 - 3).       ibuprofen 800 mg tablet      Take 1 tablet (800 mg) by mouth every 8 hours if needed for mild pain (1 - 3).              CONTINUE taking these medications        Instructions Last Dose Given Next Dose Due   Biktarvy -25 mg tablet  Generic drug: qitfxvhha-mqsluxqx-avgdsot ala      Take 1 tablet by mouth once daily.       prenatal vitamin (iron-folic) 27 mg iron-800 mcg folic acid tablet      Take 1 tablet by mouth once daily.              STOP taking these medications      benzoyl peroxide 10 % bar        benzoyl peroxide 5 % external wash        ferrous sulfate 325 (65 Fe) MG EC tablet                  Where to Get Your Medications        These medications were sent to [facility]  [address], [address], [facility] [address]      Hours: 8 AM to 6 PM Mon-Fri, 9 AM to 1 PM Saturday Phone: [phone]   acetaminophen 500 mg tablet  ibuprofen 800 mg tablet          Complications Requiring Follow-Up  HIV positive with undetectable viral load.  Continue ART.  Follow up with ID.    Test Results Pending At Discharge  Pending Labs       Order Current Status    Surgical Pathology Exam - [facility] In process            Outpatient Follow-Up  4 weeks for postpartum visit        Dank Hewitt MD

## 2024-07-16 NOTE — PROGRESS NOTES
Postpartum Progress Note    Assessment/Plan   Saba Salamanca is a 29 y.o., , who delivered at 39w1d gestation and is now PPD#1 s/p   Doing well, requesting discharge home today.  Routine postpartum instructions.  Rx motrin/tylenol.  RTO in 4 weeks for postpartum visit.    HIV+ with undetectable viral load  -Continue daily Bitkarvy  -Follow up with ID    Principal Problem:    [facility] 39 [facility]  Active Problems:    Small for dates affecting management of mother, third trimester, fetus 1 ([facility])    38w5d Problems (from 24 to present)       Problem Noted Resolved    Small for dates affecting management of mother, third trimester, fetus 1 ([facility]) 2024 by Gin Styles, DO No    Priority:  Medium      Pregnancy with 39 completed weeks gestation ([facility]) 2024 by Gin Styles, DO No    Priority:  Medium      Supervision of high risk pregnancy in third trimester ([facility]) 2024 by Buffy RANDOLPH MD No    Priority:  Medium      Overview Addendum 2024  5:24 PM by Buffy RANDOLPH MD     [x]  NT screening- WNL (2024)   [x]  cfDNA (ordered 2024)   [x]  Anatomy  [x]  Glucose screening/Third trimester labs  []   Tdap  []  GBS         Anemia in [facility] ([facility]) 10/19/2023 by Corin Cooley No    Priority:  Medium      Panic anxiety syndrome 10/19/2023 by Corin Cooley No    Priority:  Medium      Hepatitis B immune 6/15/2023 by Corin Cooley No    Priority:  Medium      Overview Signed 10/19/2023  3:47 AM by Corin Cooley     Formatting of this note might be different from the original. Hep A & B immune, HB core +, Hep C neg (2022)         Severe episode of recurrent major depressive disorder ([facility]) 2022 by Corin Cooley No    Priority:  Medium      Overview Signed 10/19/2023  3:47 AM by Corin Cooley     Last Assessment & Plan: Formatting of this note might be different from the original. A: secondary, chronic/complex  problem with mixed features, w/out psychotic features. Currently severe and resulting in disability/impaired fnx. With hx of SI and remote SA. + hopelessness P: START/RE-TRIAL FLUOXETINE 10mg HS, SSRI is first-line tx recommendation for MDD/Anxiety and PTSD; may be less sedating than alternatives and better tolerated; given clients c/o AE in the past, will start at a reduced dose and titrate very gradually         HIV infection in mother during pregnancy, antepartum ([facility]) 2021 by Corin Cooley No    Priority:  Medium      Overview Addendum 2024 11:21 AM by Buffy RANDOLPH MD     2023: CD4 936, HIV VL 30.   Biktarvy  [x]  Viral load, repeat CD4  3/7/2024: UD  2024: VL UD, CD4 857         History of spontaneous , currently pregnant ([facility]) 10/9/2018 by Buffy RANDOLPH MD 2024 by Buffy RANDOLPH MD              Subjective   Patient is doing well postpartum.  Pain is well controlled.  She is ambulating and voiding without difficulty.  VB is wnl.  +Flatus.  Bottle feeding.       Objective   Allergies:   Patient has no known allergies.         Last Vitals:  Temp Pulse Resp BP MAP Pulse Ox   36 °C (96.8 °F) 60 16 111/78   98 %     Vitals Min/Max Last 24 Hours:  Temp  Min: 36 °C (96.8 °F)  Max: 36.7 °C (98.1 °F)  Pulse  Min: 49  Max: 90  Resp  Min: 16  Max: 18  BP  Min: 103/71  Max: 120/66    Intake/Output:     Intake/Output Summary (Last 24 hours) at 2024 0933  Last data filed at 7/15/2024 2358  Gross per 24 hour   Intake --   Output 1114 ml   Net -1114 ml       Physical Exam:  Gen:  Alert, well-nourished, NAD  ABD:  Fundus firm, below umbilicus  EXT:  Trace edema, no calf tenderness

## 2024-07-19 LAB
LABORATORY COMMENT REPORT: NORMAL
PATH REPORT.FINAL DX SPEC: NORMAL
PATH REPORT.GROSS SPEC: NORMAL
PATH REPORT.RELEVANT HX SPEC: NORMAL
PATH REPORT.TOTAL CANCER: NORMAL

## 2024-07-24 ENCOUNTER — TELEPHONE (OUTPATIENT)
Dept: OBSTETRICS AND GYNECOLOGY | Facility: CLINIC | Age: 29
End: 2024-07-24
Payer: COMMERCIAL

## 2024-08-06 ENCOUNTER — TELEPHONE (OUTPATIENT)
Dept: OBSTETRICS AND GYNECOLOGY | Facility: CLINIC | Age: 29
End: 2024-08-06
Payer: COMMERCIAL

## 2024-10-02 ENCOUNTER — APPOINTMENT (OUTPATIENT)
Dept: OBSTETRICS AND GYNECOLOGY | Facility: CLINIC | Age: 29
End: 2024-10-02
Payer: COMMERCIAL

## 2024-10-02 VITALS
HEIGHT: 65 IN | DIASTOLIC BLOOD PRESSURE: 75 MMHG | SYSTOLIC BLOOD PRESSURE: 112 MMHG | BODY MASS INDEX: 28.86 KG/M2 | WEIGHT: 173.2 LBS

## 2024-10-02 RX ORDER — ZURANOLONE 25 MG/1
50 CAPSULE ORAL DAILY
Qty: 28 CAPSULE | Refills: 0 | Status: SHIPPED | OUTPATIENT
Start: 2024-10-02 | End: 2024-10-16

## 2024-10-02 ASSESSMENT — PATIENT HEALTH QUESTIONNAIRE - PHQ9
2. FEELING DOWN, DEPRESSED OR HOPELESS: NOT AT ALL
SUM OF ALL RESPONSES TO PHQ9 QUESTIONS 1 AND 2: 0
1. LITTLE INTEREST OR PLEASURE IN DOING THINGS: NOT AT ALL

## 2024-10-02 ASSESSMENT — EDINBURGH POSTNATAL DEPRESSION SCALE (EPDS)
THINGS HAVE BEEN GETTING ON TOP OF ME: YES, SOMETIMES I HAVEN'T BEEN COPING AS WELL AS USUAL
I HAVE FELT SAD OR MISERABLE: YES, QUITE OFTEN
I HAVE FELT SCARED OR PANICKY FOR NO GOOD REASON: YES, SOMETIMES
I HAVE BEEN SO UNHAPPY THAT I HAVE BEEN CRYING: ONLY OCCASIONALLY
I HAVE BEEN ANXIOUS OR WORRIED FOR NO GOOD REASON: YES, VERY OFTEN
THE THOUGHT OF HARMING MYSELF HAS OCCURRED TO ME: SOMETIMES
I HAVE BLAMED MYSELF UNNECESSARILY WHEN THINGS WENT WRONG: YES, SOME OF THE TIME
TOTAL SCORE: 19
I HAVE BEEN SO UNHAPPY THAT I HAVE HAD DIFFICULTY SLEEPING: YES, SOMETIMES
I HAVE LOOKED FORWARD WITH ENJOYMENT TO THINGS: DEFINITELY LESS THAN I USED TO
I HAVE BEEN ABLE TO LAUGH AND SEE THE FUNNY SIDE OF THINGS: NOT QUITE SO MUCH NOW

## 2024-10-02 ASSESSMENT — ENCOUNTER SYMPTOMS
NEUROLOGICAL NEGATIVE: 0
CARDIOVASCULAR NEGATIVE: 0
CONSTITUTIONAL NEGATIVE: 0
HEMATOLOGIC/LYMPHATIC NEGATIVE: 0
RESPIRATORY NEGATIVE: 0
MUSCULOSKELETAL NEGATIVE: 0
GASTROINTESTINAL NEGATIVE: 0
EYES NEGATIVE: 0
PSYCHIATRIC NEGATIVE: 0
ALLERGIC/IMMUNOLOGIC NEGATIVE: 0
ENDOCRINE NEGATIVE: 0

## 2024-10-02 ASSESSMENT — PAIN SCALES - GENERAL: PAINLEVEL: 0-NO PAIN

## 2024-10-02 NOTE — PROGRESS NOTES
Subjective   Patient ID: Saba Salamanca is a 29 y.o. female who presents for Postpartum Follow-up (Last pap none on file.).  Patient here for postpartum follow-up.  Patient has been experiencing significant depressive symptoms feeling withdrawn. EDPS 19.  Feels that she has a lot to be grateful for but often feels that her mood does not match her circumstances appropriately.        Review of Systems   Psychiatric/Behavioral:  Positive for dysphoric mood and sleep disturbance. Negative for self-injury and suicidal ideas. The patient is nervous/anxious. The patient is not hyperactive.    All other systems reviewed and are negative.      Objective   Physical Exam  Vitals reviewed.   Constitutional:       General: She is not in acute distress.     Appearance: She is not ill-appearing.   Pulmonary:      Effort: Pulmonary effort is normal.   Skin:     Coloration: Skin is not pale.   Neurological:      Mental Status: She is alert.         Assessment/Plan   Diagnoses and all orders for this visit:  Postpartum depression  -     zuranolone (Zurzuvae) 25 mg capsule; Take 50 mg by mouth once daily for 14 days.  -     Referral to Ob-Gyn Behavioral Health; Future  Routine postpartum follow-up    Patient here for postpartum follow-up.  EPDS score is 19.  Patient is amenable to trialing Zuranolone therapy.  Will also place referral to OB behavioral health.  Patient to follow-up in 2 weeks.       Winler Borges MD 10/02/24 4:26 PM

## 2024-10-02 NOTE — LETTER
October 2, 2024     Patient: Saba Salamanca   YOB: 1995   Date of Visit: 10/2/2024       To Whom It May Concern:    It is my medical opinion that Saba Salamanca should remain out of work until 11/15/2024 due to complications related to pregnancy .    If you have any questions or concerns, please don't hesitate to call.         Sincerely,        Wilner Borges MD    CC: No Recipients

## 2024-10-09 ENCOUNTER — DOCUMENTATION (OUTPATIENT)
Dept: OBSTETRICS AND GYNECOLOGY | Facility: CLINIC | Age: 29
End: 2024-10-09
Payer: COMMERCIAL

## 2024-10-09 ASSESSMENT — ENCOUNTER SYMPTOMS
SLEEP DISTURBANCE: 1
HYPERACTIVE: 0
DYSPHORIC MOOD: 1
NERVOUS/ANXIOUS: 1

## 2024-10-14 ENCOUNTER — TELEPHONE (OUTPATIENT)
Dept: OBSTETRICS AND GYNECOLOGY | Facility: CLINIC | Age: 29
End: 2024-10-14

## 2024-10-14 NOTE — TELEPHONE ENCOUNTER
THERE WAS A CALL FROM [facility] REQUESTING, A PRIOR AUTHORIZATION FOR zuranolone (Zurzuvae) 25 mg capsule.     THE PERSON THAT CALLED NAME IS JULIETH AND THE NUMBER IS [phone] EXT [phone].    THANK YOU

## 2024-10-23 ENCOUNTER — APPOINTMENT (OUTPATIENT)
Dept: OBSTETRICS AND GYNECOLOGY | Facility: CLINIC | Age: 29
End: 2024-10-23
Payer: COMMERCIAL

## 2024-10-25 ENCOUNTER — DOCUMENTATION (OUTPATIENT)
Dept: OBSTETRICS AND GYNECOLOGY | Facility: CLINIC | Age: 29
End: 2024-10-25
Payer: COMMERCIAL

## 2024-10-25 NOTE — PROGRESS NOTES
PA for Zurzuvae 25MG capsules submitted  PA Case ID #: [id number]  [place] Commercial Electronic PA Form (2017 NCP[employer])  Shanelle Graf RN

## 2025-01-07 ENCOUNTER — TELEPHONE (OUTPATIENT)
Dept: OBSTETRICS AND GYNECOLOGY | Facility: CLINIC | Age: 30
End: 2025-01-07
Payer: COMMERCIAL

## 2025-01-07 NOTE — TELEPHONE ENCOUNTER
Ms Salamanca will need depo provera send to pharmacy on file-[facility]-She is scheduled 1/9/25 and has commercial insurance primary

## 2025-01-09 ENCOUNTER — APPOINTMENT (OUTPATIENT)
Dept: OBSTETRICS AND GYNECOLOGY | Facility: CLINIC | Age: 30
End: 2025-01-09
Payer: COMMERCIAL

## 2025-01-23 ENCOUNTER — APPOINTMENT (OUTPATIENT)
Dept: OBSTETRICS AND GYNECOLOGY | Facility: CLINIC | Age: 30
End: 2025-01-23
Payer: COMMERCIAL

## 2025-07-01 ENCOUNTER — TELEPHONE (OUTPATIENT)
Dept: INFECTIOUS DISEASES | Facility: HOSPITAL | Age: 30
End: 2025-07-01
Payer: COMMERCIAL

## 2025-07-08 ENCOUNTER — TELEPHONE (OUTPATIENT)
Dept: IMMUNOLOGY | Facility: CLINIC | Age: 30
End: 2025-07-08
Payer: COMMERCIAL

## 2025-07-08 NOTE — TELEPHONE ENCOUNTER
Time Spent: 5 mins   EIS referred to pt by SW Supervisor (Lola Tate)   Pt requests to transfer care   SW confirmed hx of care ([facility])   EIS reached out to pt to schedule NPV. Pt unable to answer call.   EIS left VM and text message.    PLAN:   EIS available to schedule NPV   EIS available to complete RW   EIS will screen for barriers to care

## 2025-08-15 ENCOUNTER — HOSPITAL ENCOUNTER (EMERGENCY)
Facility: HOSPITAL | Age: 30
Discharge: HOME | End: 2025-08-15
Attending: EMERGENCY MEDICINE
Payer: COMMERCIAL

## 2025-08-15 ENCOUNTER — APPOINTMENT (OUTPATIENT)
Dept: RADIOLOGY | Facility: HOSPITAL | Age: 30
End: 2025-08-15
Payer: COMMERCIAL

## 2025-08-15 VITALS
TEMPERATURE: 98.3 F | WEIGHT: 185 LBS | HEART RATE: 79 BPM | OXYGEN SATURATION: 100 % | RESPIRATION RATE: 16 BRPM | DIASTOLIC BLOOD PRESSURE: 77 MMHG | BODY MASS INDEX: 30.82 KG/M2 | SYSTOLIC BLOOD PRESSURE: 119 MMHG | HEIGHT: 65 IN

## 2025-08-15 DIAGNOSIS — R07.89 ATYPICAL CHEST PAIN: Primary | ICD-10-CM

## 2025-08-15 DIAGNOSIS — F41.9 ANXIETY: ICD-10-CM

## 2025-08-15 LAB
ANION GAP SERPL CALCULATED.3IONS-SCNC: 9 MMOL/L (ref 10–20)
BASOPHILS # BLD AUTO: 0.04 X10*3/UL (ref 0–0.1)
BASOPHILS NFR BLD AUTO: 0.5 %
BUN SERPL-MCNC: 11 MG/DL (ref 6–23)
CALCIUM SERPL-MCNC: 8.6 MG/DL (ref 8.6–10.3)
CHLORIDE SERPL-SCNC: 109 MMOL/L (ref 98–107)
CO2 SERPL-SCNC: 24 MMOL/L (ref 21–32)
CREAT SERPL-MCNC: 0.79 MG/DL (ref 0.5–1.05)
EGFRCR SERPLBLD CKD-EPI 2021: >90 ML/MIN/1.73M*2
EOSINOPHIL # BLD AUTO: 0.16 X10*3/UL (ref 0–0.7)
EOSINOPHIL NFR BLD AUTO: 1.8 %
ERYTHROCYTE [DISTWIDTH] IN BLOOD BY AUTOMATED COUNT: 15.5 % (ref 11.5–14.5)
GLUCOSE SERPL-MCNC: 102 MG/DL (ref 74–99)
HCT VFR BLD AUTO: 35.3 % (ref 36–46)
HGB BLD-MCNC: 11 G/DL (ref 12–16)
IMM GRANULOCYTES # BLD AUTO: 0.03 X10*3/UL (ref 0–0.7)
IMM GRANULOCYTES NFR BLD AUTO: 0.3 % (ref 0–0.9)
LYMPHOCYTES # BLD AUTO: 3.44 X10*3/UL (ref 1.2–4.8)
LYMPHOCYTES NFR BLD AUTO: 39.3 %
MCH RBC QN AUTO: 23.3 PG (ref 26–34)
MCHC RBC AUTO-ENTMCNC: 31.2 G/DL (ref 32–36)
MCV RBC AUTO: 75 FL (ref 80–100)
MONOCYTES # BLD AUTO: 0.75 X10*3/UL (ref 0.1–1)
MONOCYTES NFR BLD AUTO: 8.6 %
NEUTROPHILS # BLD AUTO: 4.33 X10*3/UL (ref 1.2–7.7)
NEUTROPHILS NFR BLD AUTO: 49.5 %
NRBC BLD-RTO: 0 /100 WBCS (ref 0–0)
PLATELET # BLD AUTO: 341 X10*3/UL (ref 150–450)
POTASSIUM SERPL-SCNC: 3.9 MMOL/L (ref 3.5–5.3)
RBC # BLD AUTO: 4.73 X10*6/UL (ref 4–5.2)
SODIUM SERPL-SCNC: 138 MMOL/L (ref 136–145)
WBC # BLD AUTO: 8.8 X10*3/UL (ref 4.4–11.3)

## 2025-08-15 PROCEDURE — 71045 X-RAY EXAM CHEST 1 VIEW: CPT | Performed by: STUDENT IN AN ORGANIZED HEALTH CARE EDUCATION/TRAINING PROGRAM

## 2025-08-15 PROCEDURE — 85025 COMPLETE CBC W/AUTO DIFF WBC: CPT | Performed by: EMERGENCY MEDICINE

## 2025-08-15 PROCEDURE — 71045 X-RAY EXAM CHEST 1 VIEW: CPT

## 2025-08-15 PROCEDURE — 36415 COLL VENOUS BLD VENIPUNCTURE: CPT | Performed by: EMERGENCY MEDICINE

## 2025-08-15 PROCEDURE — 2500000001 HC RX 250 WO HCPCS SELF ADMINISTERED DRUGS (ALT 637 FOR MEDICARE OP): Performed by: EMERGENCY MEDICINE

## 2025-08-15 PROCEDURE — 99284 EMERGENCY DEPT VISIT MOD MDM: CPT | Mod: 25 | Performed by: EMERGENCY MEDICINE

## 2025-08-15 PROCEDURE — 80048 BASIC METABOLIC PNL TOTAL CA: CPT | Performed by: EMERGENCY MEDICINE

## 2025-08-15 RX ORDER — HYDROXYZINE HYDROCHLORIDE 25 MG/1
25 TABLET, FILM COATED ORAL EVERY 6 HOURS
Qty: 12 TABLET | Refills: 0 | Status: SHIPPED | OUTPATIENT
Start: 2025-08-15 | End: 2025-08-18

## 2025-08-15 RX ORDER — LORAZEPAM 1 MG/1
1 TABLET ORAL ONCE
Status: COMPLETED | OUTPATIENT
Start: 2025-08-15 | End: 2025-08-15

## 2025-08-15 RX ADMIN — LORAZEPAM 1 MG: 1 TABLET ORAL at 01:52

## 2025-08-15 ASSESSMENT — PAIN - FUNCTIONAL ASSESSMENT: PAIN_FUNCTIONAL_ASSESSMENT: 0-10

## 2025-08-15 ASSESSMENT — LIFESTYLE VARIABLES
HAVE PEOPLE ANNOYED YOU BY CRITICIZING YOUR DRINKING: NO
EVER FELT BAD OR GUILTY ABOUT YOUR DRINKING: NO
EVER HAD A DRINK FIRST THING IN THE MORNING TO STEADY YOUR NERVES TO GET RID OF A HANGOVER: NO
TOTAL SCORE: 0
HAVE YOU EVER FELT YOU SHOULD CUT DOWN ON YOUR DRINKING: NO

## 2025-08-15 ASSESSMENT — PAIN DESCRIPTION - FREQUENCY: FREQUENCY: OTHER (COMMENT)

## 2025-08-15 ASSESSMENT — PAIN DESCRIPTION - PAIN TYPE: TYPE: ACUTE PAIN

## 2025-08-15 ASSESSMENT — PAIN DESCRIPTION - ONSET: ONSET: ONGOING

## 2025-08-15 ASSESSMENT — PAIN DESCRIPTION - PROGRESSION: CLINICAL_PROGRESSION: NOT CHANGED

## 2025-08-15 ASSESSMENT — PAIN SCALES - GENERAL: PAINLEVEL_OUTOF10: 8

## 2025-08-15 ASSESSMENT — PAIN DESCRIPTION - LOCATION: LOCATION: CHEST

## 2025-08-15 ASSESSMENT — PAIN DESCRIPTION - DESCRIPTORS: DESCRIPTORS: STABBING

## 2025-08-18 ENCOUNTER — APPOINTMENT (OUTPATIENT)
Dept: IMMUNOLOGY | Facility: CLINIC | Age: 30
End: 2025-08-18
Payer: COMMERCIAL